# Patient Record
Sex: FEMALE | Race: WHITE | NOT HISPANIC OR LATINO | Employment: PART TIME | URBAN - METROPOLITAN AREA
[De-identification: names, ages, dates, MRNs, and addresses within clinical notes are randomized per-mention and may not be internally consistent; named-entity substitution may affect disease eponyms.]

---

## 2017-03-04 ENCOUNTER — HOSPITAL ENCOUNTER (EMERGENCY)
Facility: HOSPITAL | Age: 42
End: 2017-03-05
Attending: EMERGENCY MEDICINE | Admitting: EMERGENCY MEDICINE
Payer: COMMERCIAL

## 2017-03-04 DIAGNOSIS — R46.89 AGGRESSIVE BEHAVIOR: Primary | ICD-10-CM

## 2017-03-04 DIAGNOSIS — T07.XXXA MULTIPLE CONTUSIONS: ICD-10-CM

## 2017-03-04 DIAGNOSIS — F10.929 ALCOHOL INTOXICATION (HCC): ICD-10-CM

## 2017-03-04 DIAGNOSIS — Z72.89 SELF-MUTILATION: ICD-10-CM

## 2017-03-04 DIAGNOSIS — R45.89 SUICIDAL BEHAVIOR: ICD-10-CM

## 2017-03-04 LAB
ALBUMIN SERPL BCP-MCNC: 3.5 G/DL (ref 3.5–5)
ALP SERPL-CCNC: 64 U/L (ref 46–116)
ALT SERPL W P-5'-P-CCNC: 30 U/L (ref 12–78)
ANION GAP SERPL CALCULATED.3IONS-SCNC: 14 MMOL/L (ref 4–13)
APAP SERPL-MCNC: <2 UG/ML (ref 10–30)
AST SERPL W P-5'-P-CCNC: 32 U/L (ref 5–45)
BACTERIA UR QL AUTO: ABNORMAL /HPF
BASOPHILS # BLD AUTO: 0.02 THOUSANDS/ΜL (ref 0–0.1)
BASOPHILS NFR BLD AUTO: 0 % (ref 0–1)
BILIRUB SERPL-MCNC: 0.35 MG/DL (ref 0.2–1)
BILIRUB UR QL STRIP: NEGATIVE
BUN SERPL-MCNC: 17 MG/DL (ref 5–25)
CALCIUM SERPL-MCNC: 8.1 MG/DL (ref 8.3–10.1)
CHLORIDE SERPL-SCNC: 106 MMOL/L (ref 100–108)
CLARITY UR: CLEAR
CO2 SERPL-SCNC: 26 MMOL/L (ref 21–32)
COLOR UR: YELLOW
CREAT SERPL-MCNC: 0.85 MG/DL (ref 0.6–1.3)
EOSINOPHIL # BLD AUTO: 0.03 THOUSAND/ΜL (ref 0–0.61)
EOSINOPHIL NFR BLD AUTO: 0 % (ref 0–6)
ERYTHROCYTE [DISTWIDTH] IN BLOOD BY AUTOMATED COUNT: 15.4 % (ref 11.6–15.1)
ETHANOL SERPL-MCNC: 111 MG/DL (ref 0–3)
ETHANOL SERPL-MCNC: 66 MG/DL (ref 0–3)
GFR SERPL CREATININE-BSD FRML MDRD: >60 ML/MIN/1.73SQ M
GLUCOSE SERPL-MCNC: 67 MG/DL (ref 65–140)
GLUCOSE UR STRIP-MCNC: NEGATIVE MG/DL
HCG UR QL: NEGATIVE
HCT VFR BLD AUTO: 40.8 % (ref 34.8–46.1)
HGB BLD-MCNC: 12.9 G/DL (ref 11.5–15.4)
HGB UR QL STRIP.AUTO: ABNORMAL
KETONES UR STRIP-MCNC: ABNORMAL MG/DL
LEUKOCYTE ESTERASE UR QL STRIP: ABNORMAL
LYMPHOCYTES # BLD AUTO: 1.33 THOUSANDS/ΜL (ref 0.6–4.47)
LYMPHOCYTES NFR BLD AUTO: 17 % (ref 14–44)
MAGNESIUM SERPL-MCNC: 1.9 MG/DL (ref 1.6–2.6)
MCH RBC QN AUTO: 26.2 PG (ref 26.8–34.3)
MCHC RBC AUTO-ENTMCNC: 31.6 G/DL (ref 31.4–37.4)
MCV RBC AUTO: 83 FL (ref 82–98)
MONOCYTES # BLD AUTO: 0.42 THOUSAND/ΜL (ref 0.17–1.22)
MONOCYTES NFR BLD AUTO: 5 % (ref 4–12)
NEUTROPHILS # BLD AUTO: 6.14 THOUSANDS/ΜL (ref 1.85–7.62)
NEUTS SEG NFR BLD AUTO: 78 % (ref 43–75)
NITRITE UR QL STRIP: NEGATIVE
NON-SQ EPI CELLS URNS QL MICRO: ABNORMAL /HPF
NRBC BLD AUTO-RTO: 0 /100 WBCS
OTHER STN SPEC: ABNORMAL
PH UR STRIP.AUTO: 5 [PH] (ref 4.5–8)
PLATELET # BLD AUTO: 213 THOUSANDS/UL (ref 149–390)
PMV BLD AUTO: 10 FL (ref 8.9–12.7)
POTASSIUM SERPL-SCNC: 4.5 MMOL/L (ref 3.5–5.3)
PROT SERPL-MCNC: 7.4 G/DL (ref 6.4–8.2)
PROT UR STRIP-MCNC: NEGATIVE MG/DL
RBC # BLD AUTO: 4.92 MILLION/UL (ref 3.81–5.12)
RBC #/AREA URNS AUTO: ABNORMAL /HPF
SALICYLATES SERPL-MCNC: 4.1 MG/DL (ref 3–20)
SODIUM SERPL-SCNC: 146 MMOL/L (ref 136–145)
SP GR UR STRIP.AUTO: 1.02 (ref 1–1.03)
UROBILINOGEN UR QL STRIP.AUTO: 0.2 E.U./DL
WBC # BLD AUTO: 7.94 THOUSAND/UL (ref 4.31–10.16)
WBC #/AREA URNS AUTO: ABNORMAL /HPF

## 2017-03-04 PROCEDURE — 80329 ANALGESICS NON-OPIOID 1 OR 2: CPT | Performed by: EMERGENCY MEDICINE

## 2017-03-04 PROCEDURE — 81001 URINALYSIS AUTO W/SCOPE: CPT

## 2017-03-04 PROCEDURE — 80053 COMPREHEN METABOLIC PANEL: CPT | Performed by: EMERGENCY MEDICINE

## 2017-03-04 PROCEDURE — 36415 COLL VENOUS BLD VENIPUNCTURE: CPT | Performed by: EMERGENCY MEDICINE

## 2017-03-04 PROCEDURE — 96372 THER/PROPH/DIAG INJ SC/IM: CPT

## 2017-03-04 PROCEDURE — 80320 DRUG SCREEN QUANTALCOHOLS: CPT | Performed by: EMERGENCY MEDICINE

## 2017-03-04 PROCEDURE — 87086 URINE CULTURE/COLONY COUNT: CPT

## 2017-03-04 PROCEDURE — 81025 URINE PREGNANCY TEST: CPT | Performed by: EMERGENCY MEDICINE

## 2017-03-04 PROCEDURE — 81002 URINALYSIS NONAUTO W/O SCOPE: CPT | Performed by: EMERGENCY MEDICINE

## 2017-03-04 PROCEDURE — 83735 ASSAY OF MAGNESIUM: CPT | Performed by: EMERGENCY MEDICINE

## 2017-03-04 PROCEDURE — 85025 COMPLETE CBC W/AUTO DIFF WBC: CPT | Performed by: EMERGENCY MEDICINE

## 2017-03-04 RX ORDER — MIDAZOLAM HYDROCHLORIDE 1 MG/ML
INJECTION INTRAMUSCULAR; INTRAVENOUS
Status: COMPLETED
Start: 2017-03-04 | End: 2017-03-04

## 2017-03-04 RX ORDER — ZIPRASIDONE MESYLATE 20 MG/ML
10 INJECTION, POWDER, LYOPHILIZED, FOR SOLUTION INTRAMUSCULAR ONCE
Status: COMPLETED | OUTPATIENT
Start: 2017-03-04 | End: 2017-03-04

## 2017-03-04 RX ORDER — LORAZEPAM 2 MG/ML
2 INJECTION INTRAMUSCULAR ONCE
Status: COMPLETED | OUTPATIENT
Start: 2017-03-04 | End: 2017-03-04

## 2017-03-04 RX ORDER — LORAZEPAM 2 MG/ML
1 INJECTION INTRAMUSCULAR ONCE
Status: COMPLETED | OUTPATIENT
Start: 2017-03-04 | End: 2017-03-04

## 2017-03-04 RX ADMIN — LORAZEPAM 2 MG: 2 INJECTION INTRAMUSCULAR; INTRAVENOUS at 13:40

## 2017-03-04 RX ADMIN — ZIPRASIDONE MESYLATE 10 MG: 20 INJECTION, POWDER, LYOPHILIZED, FOR SOLUTION INTRAMUSCULAR at 13:40

## 2017-03-04 RX ADMIN — LORAZEPAM 1 MG: 2 INJECTION, SOLUTION INTRAMUSCULAR; INTRAVENOUS at 14:00

## 2017-03-04 RX ADMIN — ZIPRASIDONE MESYLATE 10 MG: 20 INJECTION, POWDER, LYOPHILIZED, FOR SOLUTION INTRAMUSCULAR at 14:00

## 2017-03-05 VITALS
HEART RATE: 73 BPM | SYSTOLIC BLOOD PRESSURE: 142 MMHG | RESPIRATION RATE: 18 BRPM | DIASTOLIC BLOOD PRESSURE: 81 MMHG | TEMPERATURE: 96.5 F | OXYGEN SATURATION: 93 %

## 2017-03-05 PROCEDURE — 99285 EMERGENCY DEPT VISIT HI MDM: CPT

## 2017-03-07 LAB — BACTERIA UR CULT: NORMAL

## 2017-07-22 ENCOUNTER — HOSPITAL ENCOUNTER (EMERGENCY)
Facility: HOSPITAL | Age: 42
Discharge: LEFT BEFORE DISCHARGE | End: 2017-07-22
Attending: EMERGENCY MEDICINE
Payer: COMMERCIAL

## 2017-07-22 DIAGNOSIS — N89.8 VAGINAL DISCHARGE: Primary | ICD-10-CM

## 2017-07-22 LAB
AMPHETAMINES SERPL QL SCN: NEGATIVE
BACTERIA UR QL AUTO: ABNORMAL /HPF
BARBITURATES UR QL: NEGATIVE
BENZODIAZ UR QL: NEGATIVE
BILIRUB UR QL STRIP: ABNORMAL
CLARITY UR: ABNORMAL
CLARITY, POC: NORMAL
COCAINE UR QL: POSITIVE
COLOR UR: YELLOW
COLOR, POC: YELLOW
GLUCOSE UR STRIP-MCNC: NEGATIVE MG/DL
HCG UR QL: NEGATIVE
HGB UR QL STRIP.AUTO: ABNORMAL
KETONES UR STRIP-MCNC: NEGATIVE MG/DL
LEUKOCYTE ESTERASE UR QL STRIP: NEGATIVE
METHADONE UR QL: NEGATIVE
NITRITE UR QL STRIP: NEGATIVE
NON-SQ EPI CELLS URNS QL MICRO: ABNORMAL /HPF
OPIATES UR QL SCN: NEGATIVE
PCP UR QL: NEGATIVE
PH UR STRIP.AUTO: 5 [PH] (ref 4.5–8)
PROT UR STRIP-MCNC: NEGATIVE MG/DL
RBC #/AREA URNS AUTO: ABNORMAL /HPF
SP GR UR STRIP.AUTO: >=1.03 (ref 1–1.03)
THC UR QL: NEGATIVE
UROBILINOGEN UR QL STRIP.AUTO: 0.2 E.U./DL
WBC #/AREA URNS AUTO: ABNORMAL /HPF

## 2017-07-22 PROCEDURE — 80307 DRUG TEST PRSMV CHEM ANLYZR: CPT | Performed by: NURSE PRACTITIONER

## 2017-07-22 PROCEDURE — 81002 URINALYSIS NONAUTO W/O SCOPE: CPT | Performed by: NURSE PRACTITIONER

## 2017-07-22 PROCEDURE — 81001 URINALYSIS AUTO W/SCOPE: CPT

## 2017-07-22 PROCEDURE — 87591 N.GONORRHOEAE DNA AMP PROB: CPT | Performed by: NURSE PRACTITIONER

## 2017-07-22 PROCEDURE — 81025 URINE PREGNANCY TEST: CPT | Performed by: NURSE PRACTITIONER

## 2017-07-22 PROCEDURE — 99284 EMERGENCY DEPT VISIT MOD MDM: CPT

## 2017-07-22 PROCEDURE — 87491 CHLMYD TRACH DNA AMP PROBE: CPT | Performed by: NURSE PRACTITIONER

## 2017-07-24 LAB
CHLAMYDIA DNA CVX QL NAA+PROBE: NORMAL
N GONORRHOEA DNA GENITAL QL NAA+PROBE: NORMAL

## 2018-01-17 NOTE — MISCELLANEOUS
Provider Comments  Provider Comments:   Patient did not come to her scheduled appointment today 02/25/16 so she was called but phone # listed was not in working order and there is no other # to contact her        Signatures   Electronically signed by : ISAIAS Zhang ; Feb 25 2016  6:31PM EST                       (Author)

## 2018-04-30 ENCOUNTER — TELEPHONE (OUTPATIENT)
Dept: BARIATRICS | Facility: CLINIC | Age: 43
End: 2018-04-30

## 2018-05-30 RX ORDER — ESCITALOPRAM OXALATE 10 MG/1
10 TABLET ORAL
COMMUNITY
End: 2021-06-02 | Stop reason: ALTCHOICE

## 2018-05-30 RX ORDER — LANOLIN ALCOHOL/MO/W.PET/CERES
5 CREAM (GRAM) TOPICAL
COMMUNITY

## 2018-06-06 ENCOUNTER — OFFICE VISIT (OUTPATIENT)
Dept: BARIATRICS | Facility: CLINIC | Age: 43
End: 2018-06-06

## 2018-06-06 DIAGNOSIS — E66.01 OBESITY, CLASS III, BMI 40-49.9 (MORBID OBESITY) (HCC): Primary | ICD-10-CM

## 2018-06-06 PROCEDURE — RECHECK

## 2018-06-06 NOTE — PROGRESS NOTES
Met with patient and explained she is not a candidate for bariatric surgery due to mental health diagnosis of schizophrenia

## 2020-01-21 ENCOUNTER — TELEPHONE (OUTPATIENT)
Dept: INTERNAL MEDICINE CLINIC | Facility: CLINIC | Age: 45
End: 2020-01-21

## 2020-01-21 NOTE — TELEPHONE ENCOUNTER
Unable to [facility] [place] phone says "the person you are trying to reach in unavailable, try your call later"      Will try later

## 2020-03-02 ENCOUNTER — OFFICE VISIT (OUTPATIENT)
Dept: URGENT CARE | Age: 45
End: 2020-03-02
Payer: COMMERCIAL

## 2020-03-02 VITALS
HEIGHT: 64 IN | WEIGHT: 263.2 LBS | HEART RATE: 77 BPM | OXYGEN SATURATION: 98 % | RESPIRATION RATE: 20 BRPM | BODY MASS INDEX: 44.94 KG/M2 | TEMPERATURE: 98 F

## 2020-03-02 DIAGNOSIS — N61.1 ABSCESS OF LEFT BREAST: Primary | ICD-10-CM

## 2020-03-02 PROCEDURE — G0382 LEV 3 HOSP TYPE B ED VISIT: HCPCS | Performed by: PHYSICIAN ASSISTANT

## 2020-03-02 PROCEDURE — 99203 OFFICE O/P NEW LOW 30 MIN: CPT | Performed by: PHYSICIAN ASSISTANT

## 2020-03-02 PROCEDURE — 99283 EMERGENCY DEPT VISIT LOW MDM: CPT | Performed by: PHYSICIAN ASSISTANT

## 2020-03-02 PROCEDURE — 87205 SMEAR GRAM STAIN: CPT | Performed by: PHYSICIAN ASSISTANT

## 2020-03-02 PROCEDURE — 87070 CULTURE OTHR SPECIMN AEROBIC: CPT | Performed by: PHYSICIAN ASSISTANT

## 2020-03-02 RX ORDER — ATOMOXETINE 25 MG/1
CAPSULE ORAL
COMMUNITY
Start: 2020-02-10 | End: 2021-06-02 | Stop reason: ALTCHOICE

## 2020-03-02 RX ORDER — CEPHALEXIN 500 MG/1
500 CAPSULE ORAL EVERY 8 HOURS SCHEDULED
Qty: 21 CAPSULE | Refills: 0 | Status: SHIPPED | OUTPATIENT
Start: 2020-03-02 | End: 2020-03-09

## 2020-03-02 RX ORDER — GABAPENTIN 300 MG/1
CAPSULE ORAL
COMMUNITY
End: 2021-06-02 | Stop reason: ALTCHOICE

## 2020-03-02 RX ORDER — BUSPIRONE HYDROCHLORIDE 5 MG/1
TABLET ORAL
COMMUNITY
Start: 2020-02-05 | End: 2021-06-02 | Stop reason: ALTCHOICE

## 2020-03-02 RX ORDER — SULFAMETHOXAZOLE AND TRIMETHOPRIM 800; 160 MG/1; MG/1
1 TABLET ORAL DAILY
Qty: 7 TABLET | Refills: 0 | Status: SHIPPED | OUTPATIENT
Start: 2020-03-02 | End: 2020-03-09

## 2020-03-02 RX ORDER — HALOPERIDOL 2 MG/1
TABLET ORAL
COMMUNITY
Start: 2020-02-19 | End: 2021-06-02 | Stop reason: ALTCHOICE

## 2020-03-02 NOTE — PATIENT INSTRUCTIONS
Continue to monitor symptoms  If new or worsening symptoms develop, go immediately to Er  Drink plenty of fluids  Follow up with Family Doctor this week  Abscess   WHAT YOU NEED TO KNOW:   A warm compress may help your abscess drain  Your healthcare provider may make a cut in the abscess so it can drain  You may need surgery to remove an abscess that is on your hands or buttocks  DISCHARGE INSTRUCTIONS:   Return to the emergency department if:   · The area around your abscess becomes very painful, warm, or has red streaks  · You have a fever and chills  · Your heart is beating faster than usual      · You feel faint or confused  Contact your healthcare provider if:   · Your abscess gets bigger or does not get better  · Your abscess returns  · You have questions or concerns about your condition or care  Medicines: You may  need any of the following:  · Antibiotics  help treat a bacterial infection  · Acetaminophen  decreases pain and fever  It is available without a doctor's order  Ask how much to take and how often to take it  Follow directions  Acetaminophen can cause liver damage if not taken correctly  · NSAIDs , such as ibuprofen, help decrease swelling, pain, and fever  This medicine is available with or without a doctor's order  NSAIDs can cause stomach bleeding or kidney problems in certain people  If you take blood thinner medicine, always ask your healthcare provider if NSAIDs are safe for you  Always read the medicine label and follow directions  · Take your medicine as directed  Contact your healthcare provider if you think your medicine is not helping or if you have side effects  Tell him or her if you are allergic to any medicine  Keep a list of the medicines, vitamins, and herbs you take  Include the amounts, and when and why you take them  Bring the list or the pill bottles to follow-up visits  Carry your medicine list with you in case of an emergency    Self-care: · Apply a warm compress to your abscess  This will help it open and drain  Wet a washcloth in warm, but not hot, water  Apply the compress for 10 minutes  Repeat this 4 times each day  Do not  press on an abscess or try to open it with a needle  You may push the bacteria deeper or into your blood  · Do not share your clothes, towels, or sheets with anyone  This can spread the infection to others  · Wash your hands often  This can help prevent the spread of germs  Use soap and water or an alcohol-based hand rub  Care for your wound after it is drained:   · Care for your wound as directed  If your healthcare provider says it is okay, carefully remove the bandage and gauze packing  You may need to soak the gauze to get it out of your wound  Clean your wound and the area around it as directed  Dry the area and put on new, clean bandages  Change your bandages when they get wet or dirty  · Ask your healthcare provider how to change the gauze in your wound  Keep track of how many pieces of gauze are placed inside the wound  Do not put too much packing in the wound  Do not pack the gauze too tightly in your wound  Follow up with your healthcare provider in 1 to 3 days: You may need to have your packing removed or your bandage changed  Write down your questions so you remember to ask them during your visits  © 2017 [employer] [employer] Information is for End User's use only and may not be sold, redistributed or otherwise used for commercial purposes  All illustrations and images included in CareNotes® are the copyrighted property of A D A M [employer]  or [employer]  The above information is an  only  It is not intended as medical advice for individual conditions or treatments  Talk to your doctor, nurse or pharmacist before following any medical regimen to see if it is safe and effective for you

## 2020-03-02 NOTE — PROGRESS NOTES
[facility]        NAME: Arnold Shaw is a 53 y o  female  : 1975    MRN: [id number]  DATE: 2020  TIME: 2:12 PM    Assessment and Plan   Abscess of left breast [N61 1]  1  Abscess of left breast  cephalexin (KEFLEX) 500 mg capsule    sulfamethoxazole-trimethoprim (BACTRIM DS) 800-160 mg per tablet    Ambulatory referral to Dermatology    Wound culture and Gram stain         Patient Instructions       Take medications as directed  Drink plenty of fluids  Follow up with family doctor this week  Go to ER immediately if new or worsening symptoms occur  Chief Complaint     Chief Complaint   Patient presents with    Recurrent Skin Infections     Pt started Thursday with a boil on her left breast   Pt states she has been applying Neosporin and A & D ointment, boil popped last roshni  C/O burning and pain, has some drainage  Had boil in same area approx 1-2 years ago  History of Present Illness       Rash   Episode onset: 4 days ago pt developed abscess to L breast   Slowly growing  This morning it came to a head and she squeezed it  She describes copius green drainage  Still mild ttp  She has had one in this area about 1 year ago  The problem has been gradually improving since onset  Associated with: No new exposures  Patient has history of recurrent abscesses in groin, axilla, and once before in the breast  Pertinent negatives include no diarrhea, fatigue, fever, shortness of breath or vomiting  (No fevers, chills, nausea, vomiting  No systemic signs of infection) Treatments tried: Warm compresses, A&D ointment, Neosporin  The treatment provided moderate relief  Review of Systems   Review of Systems   Constitutional: Negative  Negative for chills, fatigue and fever  HENT: Negative  Eyes: Negative  Respiratory: Negative  Negative for chest tightness, shortness of breath and wheezing  Cardiovascular: Negative  Negative for chest pain and palpitations  Gastrointestinal: Negative for abdominal pain, constipation, diarrhea, nausea and vomiting  Endocrine: Negative  Genitourinary: Negative for dysuria, flank pain, frequency, pelvic pain, vaginal discharge and vaginal pain  Musculoskeletal: Negative for back pain, gait problem, neck pain and neck stiffness  Skin: Positive for rash  Negative for pallor  Allergic/Immunologic: Negative  Neurological: Negative  Negative for weakness and numbness  Hematological: Negative  Psychiatric/Behavioral: Negative            Current Medications       Current Outpatient Medications:     ALBUTEROL SULFATE IN, Inhale, Disp: , Rfl:     atoMOXetine (STRATTERA) 25 mg capsule, TK ONE C PO ONCE D, Disp: , Rfl:     busPIRone (BUSPAR) 5 mg tablet, TK 1 T PO TID, Disp: , Rfl:     cephalexin (KEFLEX) 500 mg capsule, Take 1 capsule (500 mg total) by mouth every 8 (eight) hours for 7 days, Disp: 21 capsule, Rfl: 0    DOXEPIN HCL PO, Take by mouth, Disp: , Rfl:     escitalopram (LEXAPRO) 10 mg tablet, Take 10 mg by mouth, Disp: , Rfl:     gabapentin (NEURONTIN) 300 mg capsule, gabapentin 600 mg tablet, Disp: , Rfl:     haloperidol (HALDOL) 2 mg tablet, TK 1 T PO  BID, Disp: , Rfl:     melatonin 3 mg, Take 5 mg by mouth, Disp: , Rfl:     QUEtiapine Fumarate (SEROQUEL PO), Take by mouth, Disp: , Rfl:     sulfamethoxazole-trimethoprim (BACTRIM DS) 800-160 mg per tablet, Take 1 tablet by mouth daily for 7 days, Disp: 7 tablet, Rfl: 0    Current Allergies     Allergies as of 03/02/2020    (No Known Allergies)            The following portions of the patient's history were reviewed and updated as appropriate: allergies, current medications, past family history, past medical history, past social history, past surgical history and problem list      Past Medical History:   Diagnosis Date    Asthma     Bipolar disorder ([facility] [address] )     Hepatitis C     Psychiatric disorder        Past Surgical History:   Procedure Laterality Date    HAND SURGERY      TUBAL LIGATION         Family History   Problem Relation Age of Onset    Coronary artery disease Mother     Diabetes Mother          Medications have been verified  Objective   Pulse 77   Temp 98 °F (36 7 °C)   Resp 20   Ht 5' 4" (1 626 m)   Wt 119 kg (263 lb 3 2 oz)   SpO2 98%   BMI 45 18 kg/m²        Physical Exam     Physical Exam   Constitutional: She appears well-developed and well-nourished  No distress  HENT:   Head: Normocephalic and atraumatic  Cardiovascular: Normal rate, regular rhythm, normal heart sounds and intact distal pulses  Pulmonary/Chest: Effort normal and breath sounds normal  No respiratory distress  She has no wheezes  She has no rales  Skin: Skin is warm  Capillary refill takes less than 2 seconds  She is not diaphoretic  No pallor  Quarter-sized area of erythematous tender indurated skin to left breast   Small central opening with small amount of green discharge visualized  Unable to express more discharge with palpation  No venous streaking  No palpable fluctuance noted   Nursing note and vitals reviewed

## 2020-03-04 LAB
BACTERIA WND AEROBE CULT: NORMAL
GRAM STN SPEC: NORMAL
GRAM STN SPEC: NORMAL

## 2020-07-01 ENCOUNTER — OFFICE VISIT (OUTPATIENT)
Dept: URGENT CARE | Age: 45
End: 2020-07-01
Payer: COMMERCIAL

## 2020-07-01 ENCOUNTER — APPOINTMENT (OUTPATIENT)
Dept: RADIOLOGY | Age: 45
End: 2020-07-01
Payer: COMMERCIAL

## 2020-07-01 VITALS
WEIGHT: 263 LBS | OXYGEN SATURATION: 100 % | RESPIRATION RATE: 22 BRPM | HEIGHT: 64 IN | BODY MASS INDEX: 44.9 KG/M2 | HEART RATE: 80 BPM | TEMPERATURE: 98 F | DIASTOLIC BLOOD PRESSURE: 68 MMHG | SYSTOLIC BLOOD PRESSURE: 142 MMHG

## 2020-07-01 DIAGNOSIS — M79.662 PAIN IN LEFT LOWER LEG: ICD-10-CM
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
## 2020-07-01 PROCEDURE — 99283 EMERGENCY DEPT VISIT LOW MDM: CPT | Performed by: NURSE PRACTITIONER

## 2020-07-01 PROCEDURE — 99213 OFFICE O/P EST LOW 20 MIN: CPT | Performed by: NURSE PRACTITIONER

## 2020-07-01 PROCEDURE — G0382 LEV 3 HOSP TYPE B ED VISIT: HCPCS | Performed by: NURSE PRACTITIONER

## 2020-07-01 PROCEDURE — 73590 X-RAY EXAM OF LOWER LEG: CPT

## 2020-07-02 NOTE — PROGRESS NOTES
NAME: Henry Alexandre is a 50 y o  female  : 1975    MRN: [id number]    /68   Pulse 80   Temp 98 °F (36 7 °C)   Resp 22   Ht 5' 4" (1 626 m)   Wt 119 kg (263 lb)   SpO2 100%   BMI 45 14 kg/m²     Assessment and Plan   Fall, initial encounter [W19  XXXA]  1  Fall, initial encounter  XR tibia fibula 2 vw left    Transfer to other facility   2  Pain in left lower leg  Transfer to other facility       Dyan was seen today for leg swelling  Diagnoses and all orders for this visit:    Fall, initial encounter  -     XR tibia fibula 2 vw left; Future  -     Transfer to other facility    Pain in left lower leg  -     Transfer to other facility        Patient Instructions   Patient Instructions   Follow up with pcp  Take meds as directed  Tylenol and motrin for pain   If worse go to the ER, discussed going to night and states she will go tomorrow,      Leg Pain   WHAT YOU NEED TO KNOW:   Leg pain may be caused by a variety of health conditions  Your tests did not show any broken bones or blood clots  DISCHARGE INSTRUCTIONS:   Return to the emergency department if:   · You have a fever  · Your leg starts to swell  · Your leg pain gets worse  · You have numbness or tingling in your leg or toes  · You cannot put any weight on or move your leg  Contact your healthcare provider if:   · Your pain does not decrease, even after treatment  · You have questions or concerns about your condition or care  Medicines:   · NSAIDs , such as ibuprofen, help decrease swelling, pain, and fever  This medicine is available with or without a doctor's order  NSAIDs can cause stomach bleeding or kidney problems in certain people  If you take blood thinner medicine, always ask your healthcare provider if NSAIDs are safe for you  Always read the medicine label and follow directions  · Take your medicine as directed    Contact your healthcare provider if you think your medicine is not helping or if you have side effects  Tell him of her if you are allergic to any medicine  Keep a list of the medicines, vitamins, and herbs you take  Include the amounts, and when and why you take them  Bring the list or the pill bottles to follow-up visits  Carry your medicine list with you in case of an emergency  Follow up with your healthcare provider as directed: You may need more tests to find the cause of your leg pain  You may need to see an orthopedic specialist or a physical therapist  Write down your questions so you remember to ask them during your visits  Manage your leg pain:   · Rest  your injured leg so that it can heal  You may need an immobilizer, brace, or splint to limit the movement of your leg  You may need to avoid putting any weight on your leg for at least 48 hours  Return to normal activities as directed  · Ice  the injury for 20 minutes every 4 hours for up to 24 hours, or as directed  Use an ice pack, or put crushed ice in a plastic bag  Cover it with a towel to protect your skin  Ice helps prevent tissue damage and decreases swelling and pain  · Elevate  your injured leg above the level of your heart as often as you can  This will help decrease swelling and pain  If possible, prop your leg on pillows or blankets to keep the area elevated comfortably  · Use assistive devices as directed  You may need to use a cane or crutches  Assistive devices help decrease pain and pressure on your leg when you walk  Ask your healthcare provider for more information about assistive devices and how to use them correctly  · Maintain a healthy weight  Extra body weight can cause pressure and pain in your hip, knee, and ankle joints  Ask your healthcare provider how much you should weigh  Ask him to help you create a weight loss plan if you are overweight  © 2017 [employer] Information is for End User's use only and may not be sold, redistributed or otherwise used for commercial purposes  All illustrations and images included in CareNotes® are the copyrighted property of [employer]  or [employer]obar  The above information is an  only  It is not intended as medical advice for individual conditions or treatments  Talk to your doctor, nurse or pharmacist before following any medical regimen to see if it is safe and effective for you  Proceed to ER if symptoms worsen  Chief Complaint     Chief Complaint   Patient presents with    Leg Swelling     pt is c/o pain to her left foot and calf area  History of Present Illness     49 yo obese female here today with lower left leg pain that has been going on for the past 3 days  Patient states that she fell and felt a pop to the back of her lower left leg  She has a small bruise present and is adamant that she has stayed in bed for 3 days with the help of her friend assisting her to use the restroom  Patient verbalizes "I literally have stating my bed for 3 days straight"  Patient has used over-the-counter meds and ice with no relief  Patient denies the ability to stand up on her left leg and bear weight and not attempting at time of visit  She is sitting and hospital wheelchair at this time and discussed going to the ER for further evaluation  Patient states she perhaps will go tomorrow  She has pain to the calf area of the left leg and discussed possibilities of blood clots and refused to go at this time but may be later or tomorrow she will go  She states it all depends on her friend schedule a when she can go  I discussed in detail with patient the need for further evaluation  Review of Systems   Review of Systems   Musculoskeletal: Positive for gait problem          Lower left leg   Skin:        Bruise to the left leg         Current Medications       Current Outpatient Medications:     ALBUTEROL SULFATE IN, Inhale, Disp: , Rfl:     atoMOXetine (STRATTERA) 25 mg capsule, TK ONE C PO ONCE D, Disp: , Rfl:     busPIRone (BUSPAR) 5 mg tablet, TK 1 T PO TID, Disp: , Rfl:     DOXEPIN HCL PO, Take by mouth, Disp: , Rfl:     escitalopram (LEXAPRO) 10 mg tablet, Take 10 mg by mouth, Disp: , Rfl:     gabapentin (NEURONTIN) 300 mg capsule, gabapentin 600 mg tablet, Disp: , Rfl:     haloperidol (HALDOL) 2 mg tablet, TK 1 T PO  BID, Disp: , Rfl:     melatonin 3 mg, Take 5 mg by mouth, Disp: , Rfl:     QUEtiapine Fumarate (SEROQUEL PO), Take by mouth, Disp: , Rfl:     Current Allergies     Allergies as of 07/01/2020    (No Known Allergies)              Past Medical History:   Diagnosis Date    Asthma     [facility] disorder ([facility] [address] )     Hepatitis C     Psychiatric disorder        Past Surgical History:   Procedure Laterality Date    HAND SURGERY      TUBAL LIGATION         Family History   Problem Relation Age of Onset    Coronary artery disease Mother     Diabetes Mother          Medications have been verified  The following portions of the patient's history were reviewed and updated as appropriate: allergies, current medications, past family history, past medical history, past social history, past surgical history and problem list     Objective   /68   Pulse 80   Temp 98 °F (36 7 °C)   Resp 22   Ht 5' 4" (1 626 m)   Wt 119 kg (263 lb)   SpO2 100%   BMI 45 14 kg/m²      Physical Exam     Physical Exam   Constitutional: She is oriented to person, place, and time  Musculoskeletal:        Left lower leg: She exhibits tenderness, swelling and edema  She exhibits no bony tenderness, no deformity and no laceration  Legs:  Neurological: She is alert and oriented to person, place, and time  Skin: Capillary refill takes less than 2 seconds  Ecchymosis (Lower left leg) noted  No rash noted  Psychiatric: Her mood appears anxious         FIDE Antonio

## 2020-07-02 NOTE — PATIENT INSTRUCTIONS
Follow up with pcp  Take meds as directed  Tylenol and motrin for pain   If worse go to the ER, discussed going to night and states she will go tomorrow,      Leg Pain   WHAT YOU NEED TO KNOW:   Leg pain may be caused by a variety of health conditions  Your tests did not show any broken bones or blood clots  DISCHARGE INSTRUCTIONS:   Return to the emergency department if:   · You have a fever  · Your leg starts to swell  · Your leg pain gets worse  · You have numbness or tingling in your leg or toes  · You cannot put any weight on or move your leg  Contact your healthcare provider if:   · Your pain does not decrease, even after treatment  · You have questions or concerns about your condition or care  Medicines:   · NSAIDs , such as ibuprofen, help decrease swelling, pain, and fever  This medicine is available with or without a doctor's order  NSAIDs can cause stomach bleeding or kidney problems in certain people  If you take blood thinner medicine, always ask your healthcare provider if NSAIDs are safe for you  Always read the medicine label and follow directions  · Take your medicine as directed  Contact your healthcare provider if you think your medicine is not helping or if you have side effects  Tell him of her if you are allergic to any medicine  Keep a list of the medicines, vitamins, and herbs you take  Include the amounts, and when and why you take them  Bring the list or the pill bottles to follow-up visits  Carry your medicine list with you in case of an emergency  Follow up with your healthcare provider as directed: You may need more tests to find the cause of your leg pain  You may need to see an orthopedic specialist or a physical therapist  Write down your questions so you remember to ask them during your visits  Manage your leg pain:   · Rest  your injured leg so that it can heal  You may need an immobilizer, brace, or splint to limit the movement of your leg   You may need to avoid putting any weight on your leg for at least 48 hours  Return to normal activities as directed  · Ice  the injury for 20 minutes every 4 hours for up to 24 hours, or as directed  Use an ice pack, or put crushed ice in a plastic bag  Cover it with a towel to protect your skin  Ice helps prevent tissue damage and decreases swelling and pain  · Elevate  your injured leg above the level of your heart as often as you can  This will help decrease swelling and pain  If possible, prop your leg on pillows or blankets to keep the area elevated comfortably  · Use assistive devices as directed  You may need to use a cane or crutches  Assistive devices help decrease pain and pressure on your leg when you walk  Ask your healthcare provider for more information about assistive devices and how to use them correctly  · Maintain a healthy weight  Extra body weight can cause pressure and pain in your hip, knee, and ankle joints  Ask your healthcare provider how much you should weigh  Ask him to help you create a weight loss plan if you are overweight  © 2017 [address] [facility] [place] Information is for End User's use only and may not be sold, redistributed or otherwise used for commercial purposes  All illustrations and images included in CareNotes® are the copyrighted property of [employer] M [employer]  or [employer]  The above information is an  only  It is not intended as medical advice for individual conditions or treatments  Talk to your doctor, nurse or pharmacist before following any medical regimen to see if it is safe and effective for you

## 2020-07-03 ENCOUNTER — HOSPITAL ENCOUNTER (EMERGENCY)
Facility: HOSPITAL | Age: 45
Discharge: HOME | End: 2020-07-03
Attending: EMERGENCY MEDICINE
Payer: COMMERCIAL

## 2020-07-03 VITALS
WEIGHT: 258.38 LBS | BODY MASS INDEX: 44.35 KG/M2 | RESPIRATION RATE: 20 BRPM | SYSTOLIC BLOOD PRESSURE: 115 MMHG | OXYGEN SATURATION: 98 % | TEMPERATURE: 97.7 F | HEART RATE: 93 BPM | DIASTOLIC BLOOD PRESSURE: 86 MMHG

## 2020-07-03 DIAGNOSIS — S80.12XA TRAUMATIC ECCHYMOSIS OF LEFT LOWER LEG, INITIAL ENCOUNTER: ICD-10-CM

## 2020-07-03 DIAGNOSIS — M79.662 PAIN OF LEFT CALF: Primary | ICD-10-CM

## 2020-07-03 PROCEDURE — 99283 EMERGENCY DEPT VISIT LOW MDM: CPT

## 2020-07-03 PROCEDURE — 99282 EMERGENCY DEPT VISIT SF MDM: CPT | Performed by: EMERGENCY MEDICINE

## 2020-07-03 NOTE — DISCHARGE INSTRUCTIONS
Tendon Rupture   WHAT YOU NEED TO KNOW:   A tendon rupture is a partial or complete tear of your tendon  Tendons are tough bands of tissue that attach your muscles to your bones  A tear may be caused by an injury or increased pressure on the tendon that occurs during sports or a fall  Your risk may be higher if you have a weak tendon  Weak tendons may be caused by tendonitis, use of steroids, older age, and chronic conditions such as arthritis  DISCHARGE INSTRUCTIONS:   Seek care immediately if:   · You have severe pain in the injured area, even after you take medicine  · Your arm or leg feels warm, tender, and painful  It may look swollen and red  · You feel lightheaded, short of breath, and have chest pain  · You cough up blood  Contact your healthcare provider if:   · Your symptoms do not get better with treatment  · You feel another pop, snap, or crack in your tendon  · You have questions or concerns about your condition or care  Medicines:   · NSAIDs , such as ibuprofen, help decrease swelling, pain, and fever  This medicine is available with or without a doctor's order  NSAIDs can cause stomach bleeding or kidney problems in certain people  If you take blood thinner medicine, always ask your healthcare provider if NSAIDs are safe for you  Always read the medicine label and follow directions  · Acetaminophen  decreases pain  It is available without a doctor's order  Ask how much to take and how often to take it  Follow directions  Acetaminophen can cause liver damage if not taken correctly  · Take your medicine as directed  Contact your healthcare provider if you think your medicine is not helping or if you have side effects  Tell him or her if you are allergic to any medicine  Keep a list of the medicines, vitamins, and herbs you take  Include the amounts, and when and why you take them  Bring the list or the pill bottles to follow-up visits   Carry your medicine list with you in case of an emergency  Follow up with your healthcare provider as directed:  Write down your questions so you remember to ask them during your visits  Self-care:   · Rest  the injured tendon until pain and swelling have decreased  Ask your healthcare provider what activities you can do while your tendon heals  · Apply ice  on your tendon for 15 to 20 minutes every hour for 48 hours or as directed  Use an ice pack, or put crushed ice in a plastic bag  Cover it with a towel  Ice helps prevent tissue damage and decreases swelling and pain  · Compress  the injury with an elastic bandage, air cast, medical boot, or splint to reduce swelling  Ask your healthcare provider which compression device to use, and how tight it should be  · Elevate  the injured area above the level of your heart as often as you can  This will help decrease swelling and pain  If possible, prop the injured area on pillows or blankets to keep it elevated comfortably  Wear support devices as directed  You may need a brace, cast, or splint to limit movement and protect your tendon  If the tendon rupture is in your leg, you may need to use crutches  This will decrease pain as you move around  Go to physical therapy as directed:  A physical therapist teaches you exercises to help improve movement and strength  © 2017 [facility] Information is for End User's use only and may not be sold, redistributed or otherwise used for commercial purposes  All illustrations and images included in CareNotes® are the copyrighted property of [employer] , [employer]  or [employer]  The above information is an  only  It is not intended as medical advice for individual conditions or treatments  Talk to your doctor, nurse or pharmacist before following any medical regimen to see if it is safe and effective for you

## 2020-07-03 NOTE — ED PROVIDER NOTES
History  Chief Complaint   Patient presents with    Leg Pain     pt states running after cat 4 days ago, heard a pop in lower posterior leg, was seen at urgent care told to be seen at ER for further testing  Pt is a 50year old female presenting with left calf pain x 4 days  Pt states she was running after a cat when she felt a sudden pain and popping sensation in her left calf  She was able to ambulate after but with pain  She notes bruising and swelling at the site of the injury  Denies fevers, erythema, warmth, n/v/d  States the pain has been improving since the injury  Seen at urgent care and told to come to ED for DVT evaluation  Denies history of DVT, recent surgery or travel  Prior to Admission Medications   Prescriptions Last Dose Informant Patient Reported? Taking? ALBUTEROL SULFATE IN   Yes No   Sig: Inhale   DOXEPIN HCL PO   Yes No   Sig: Take by mouth   QUEtiapine Fumarate (SEROQUEL PO)   Yes No   Sig: Take by mouth   atoMOXetine (STRATTERA) 25 mg capsule   Yes No   Sig: TK ONE C PO ONCE D   busPIRone (BUSPAR) 5 mg tablet   Yes No   Sig: TK 1 T PO TID   escitalopram (LEXAPRO) 10 mg tablet   Yes No   Sig: Take 10 mg by mouth   gabapentin (NEURONTIN) 300 mg capsule   Yes No   Sig: gabapentin 600 mg tablet   haloperidol (HALDOL) 2 mg tablet   Yes No   Sig: TK 1 T PO  BID   melatonin 3 mg   Yes No   Sig: Take 5 mg by mouth      Facility-Administered Medications: None       Past Medical History:   Diagnosis Date    Asthma     Bipolar disorder (HCC)     Hepatitis C     Psychiatric disorder        Past Surgical History:   Procedure Laterality Date    HAND SURGERY      TUBAL LIGATION         Family History   Problem Relation Age of Onset    Coronary artery disease Mother     Diabetes Mother      I have reviewed and agree with the history as documented      E-Cigarette/Vaping     E-Cigarette/Vaping Substances     Social History     Tobacco Use    Smoking status: Current Every Day Smoker Packs/day: 0 50     Types: Cigarettes    Smokeless tobacco: Never Used   Substance Use Topics    Alcohol use: Not Currently    Drug use: Not Currently     Types: Marijuana     Comment: crack        Review of Systems   Constitutional: Negative  HENT: Negative  Respiratory: Negative  Cardiovascular: Positive for leg swelling  Negative for chest pain and palpitations  Gastrointestinal: Negative  Genitourinary: Negative  Musculoskeletal: Positive for myalgias  Negative for arthralgias and joint swelling  Skin: Positive for color change  Neurological: Negative  All other systems reviewed and are negative  Physical Exam  Physical Exam   Constitutional: She is oriented to person, place, and time  She appears well-developed and well-nourished  No distress  HENT:   Head: Normocephalic and atraumatic  Right Ear: External ear normal    Left Ear: External ear normal    Nose: Nose normal    Eyes: Conjunctivae and EOM are normal  Right eye exhibits no discharge  Left eye exhibits no discharge  No scleral icterus  Neck: Normal range of motion  Neck supple  Cardiovascular: Normal rate, regular rhythm, normal heart sounds and intact distal pulses  Pulses:       Dorsalis pedis pulses are 2+ on the right side, and 2+ on the left side  Pulmonary/Chest: Effort normal and breath sounds normal    Musculoskeletal: Normal range of motion  Left knee: Normal         Left ankle: Achilles tendon exhibits pain  Achilles tendon exhibits no defect and normal Raman's test results  Left lower leg: She exhibits tenderness and swelling  She exhibits no bony tenderness, no edema, no deformity and no laceration  TTP of the posterior left calf with swelling and ecchymosis to area  Neurovascularly in tact distally  Neurological: She is alert and oriented to person, place, and time  Skin: Skin is warm and dry  She is not diaphoretic         Vital Signs  ED Triage Vitals [07/03/20 0043] Temperature Pulse Respirations Blood Pressure SpO2   97 7 °F (36 5 °C) 93 20 115/86 98 %      Temp Source Heart Rate Source Patient Position - Orthostatic VS BP Location FiO2 (%)   Temporal Monitor Sitting Right arm --      Pain Score       --           Vitals:    07/03/20 0043   BP: 115/86   Pulse: 93   Patient Position - Orthostatic VS: Sitting         Visual Acuity      ED Medications  Medications - No data to display    Diagnostic Studies  Results Reviewed     None                 No orders to display              Procedures  Procedures         ED Course       US AUDIT      Most Recent Value   Initial Alcohol Screen: US AUDIT-C    1  How often do you have a drink containing alcohol?  0 Filed at: 07/03/2020 0054   2  How many drinks containing alcohol do you have on a typical day you are drinking? 0 Filed at: 07/03/2020 0054   3a  Male UNDER 65: How often do you have five or more drinks on one occasion? 0 Filed at: 07/03/2020 0054   3b  FEMALE Any Age, or MALE 65+: How often do you have 4 or more drinks on one occassion? 0 Filed at: 07/03/2020 0054   Audit-C Score  0 Filed at: 07/03/2020 0054                  JERONIMO/DAST-10      Most Recent Value   How many times in the past year have you    Used an illegal drug or used a prescription medication for non-medical reasons? Never Filed at: 07/03/2020 0054                                MDM  Number of Diagnoses or Management Options  Pain of left calf:   Traumatic ecchymosis of left lower leg, initial encounter:   Diagnosis management comments: Based on history and exam I do not suspect DVT but gastrocnemius or soleus tear  She is neurovascularly in tact distally and has normal ROM  I do not feel she has tear of her achilles based on history and exam  Follow up with PCP          Disposition  Final diagnoses:   Pain of left calf   Traumatic ecchymosis of left lower leg, initial encounter     Time reflects when diagnosis was documented in both MDM as applicable and the Disposition within this note     Time User Action Codes Description Comment    7/3/2020  1:19 AM Connor Pilar Add [S22 246] Left leg pain     7/3/2020  1:19 AM Aleksey CASAS Remove [M79 605] Left leg pain     7/3/2020  1:19 AM Aleksey CASAS Add [M79 662] Pain of left calf     7/3/2020  1:20 AM Connor Pilar Add [S80 12XA] Traumatic ecchymosis of left lower leg, initial encounter       ED Disposition     ED Disposition Condition Date/Time Comment    Discharge Good Fri Jul 3, 2020  1:19 AM Alejandra Giraldo discharge to home/self care  Follow-up Information     Follow up With Specialties Details Why María Souza MD Family Medicine Schedule an appointment as soon as possible for a visit today  [address] [place] [address] [address]  [phone]            Discharge Medication List as of 7/3/2020  1:21 AM      CONTINUE these medications which have NOT CHANGED    Details   ALBUTEROL SULFATE IN Inhale, Until Discontinued, Historical Med      atoMOXetine (STRATTERA) 25 mg capsule TK ONE C PO ONCE D, Historical Med      busPIRone (BUSPAR) 5 mg tablet TK 1 T PO TID, Historical Med      DOXEPIN HCL PO Take by mouth, Until Discontinued, Historical Med      escitalopram (LEXAPRO) 10 mg tablet Take 10 mg by mouth, Historical Med      gabapentin (NEURONTIN) 300 mg capsule gabapentin 600 mg tablet, Historical Med      haloperidol (HALDOL) 2 mg tablet TK 1 T PO  BID, Historical Med      melatonin 3 mg Take 5 mg by mouth, Historical Med      QUEtiapine Fumarate (SEROQUEL PO) Take by mouth, Until Discontinued, Historical Med           No discharge procedures on file      PDMP Review     None          ED Provider  Electronically Signed by           Britton Arreaga PA-C  07/03/20 9094

## 2020-07-17 ENCOUNTER — APPOINTMENT (EMERGENCY)
Dept: RADIOLOGY | Facility: HOSPITAL | Age: 45
End: 2020-07-17
Payer: COMMERCIAL

## 2020-07-17 ENCOUNTER — HOSPITAL ENCOUNTER (EMERGENCY)
Facility: HOSPITAL | Age: 45
Discharge: HOME | End: 2020-07-18
Attending: EMERGENCY MEDICINE | Admitting: EMERGENCY MEDICINE
Payer: COMMERCIAL

## 2020-07-17 ENCOUNTER — OFFICE VISIT (OUTPATIENT)
Dept: URGENT CARE | Age: 45
End: 2020-07-17
Payer: COMMERCIAL

## 2020-07-17 VITALS
OXYGEN SATURATION: 96 % | RESPIRATION RATE: 15 BRPM | HEART RATE: 70 BPM | DIASTOLIC BLOOD PRESSURE: 65 MMHG | TEMPERATURE: 97.4 F | SYSTOLIC BLOOD PRESSURE: 137 MMHG

## 2020-07-17 DIAGNOSIS — R07.9 CHEST PAIN, UNSPECIFIED TYPE: Primary | ICD-10-CM

## 2020-07-17 DIAGNOSIS — R07.9 CHEST PAIN: Primary | ICD-10-CM

## 2020-07-17 DIAGNOSIS — R10.9 ABDOMINAL PAIN, UNSPECIFIED ABDOMINAL LOCATION: ICD-10-CM

## 2020-07-17 DIAGNOSIS — B96.89 BACTERIAL VAGINOSIS: ICD-10-CM

## 2020-07-17 DIAGNOSIS — N76.0 BACTERIAL VAGINOSIS: ICD-10-CM

## 2020-07-17 LAB
ANION GAP SERPL CALCULATED.3IONS-SCNC: 8 MMOL/L (ref 4–13)
ATRIAL RATE: 71 BPM
BASOPHILS # BLD AUTO: 0.05 THOUSANDS/ΜL (ref 0–0.1)
BASOPHILS NFR BLD AUTO: 1 % (ref 0–1)
BILIRUB UR QL STRIP: NEGATIVE
BUN SERPL-MCNC: 19 MG/DL (ref 5–25)
CALCIUM SERPL-MCNC: 8.2 MG/DL (ref 8.3–10.1)
CHLORIDE SERPL-SCNC: 103 MMOL/L (ref 100–108)
CLARITY UR: CLEAR
CLARITY, POC: CLEAR
CO2 SERPL-SCNC: 29 MMOL/L (ref 21–32)
COLOR UR: YELLOW
COLOR, POC: YELLOW
CREAT SERPL-MCNC: 1.04 MG/DL (ref 0.6–1.3)
EOSINOPHIL # BLD AUTO: 0.13 THOUSAND/ΜL (ref 0–0.61)
EOSINOPHIL NFR BLD AUTO: 2 % (ref 0–6)
ERYTHROCYTE [DISTWIDTH] IN BLOOD BY AUTOMATED COUNT: 14.9 % (ref 11.6–15.1)
EXT PREG TEST URINE: NEGATIVE
EXT. CONTROL ED NAV: NORMAL
GFR SERPL CREATININE-BSD FRML MDRD: 64 ML/MIN/1.73SQ M
GLUCOSE SERPL-MCNC: 106 MG/DL (ref 65–140)
GLUCOSE UR STRIP-MCNC: NEGATIVE MG/DL
HCT VFR BLD AUTO: 39.4 % (ref 34.8–46.1)
HGB BLD-MCNC: 12.4 G/DL (ref 11.5–15.4)
HGB UR QL STRIP.AUTO: NEGATIVE
IMM GRANULOCYTES # BLD AUTO: 0.01 THOUSAND/UL (ref 0–0.2)
IMM GRANULOCYTES NFR BLD AUTO: 0 % (ref 0–2)
KETONES UR STRIP-MCNC: NEGATIVE MG/DL
LEUKOCYTE ESTERASE UR QL STRIP: NEGATIVE
LYMPHOCYTES # BLD AUTO: 2.23 THOUSANDS/ΜL (ref 0.6–4.47)
LYMPHOCYTES NFR BLD AUTO: 34 % (ref 14–44)
MCH RBC QN AUTO: 25.9 PG (ref 26.8–34.3)
MCHC RBC AUTO-ENTMCNC: 31.5 G/DL (ref 31.4–37.4)
MCV RBC AUTO: 82 FL (ref 82–98)
MONOCYTES # BLD AUTO: 0.44 THOUSAND/ΜL (ref 0.17–1.22)
MONOCYTES NFR BLD AUTO: 7 % (ref 4–12)
NEUTROPHILS # BLD AUTO: 3.65 THOUSANDS/ΜL (ref 1.85–7.62)
NEUTS SEG NFR BLD AUTO: 56 % (ref 43–75)
NITRITE UR QL STRIP: NEGATIVE
NRBC BLD AUTO-RTO: 0 /100 WBCS
P AXIS: 36 DEGREES
PH UR STRIP.AUTO: 5.5 [PH] (ref 4.5–8)
PLATELET # BLD AUTO: 206 THOUSANDS/UL (ref 149–390)
PMV BLD AUTO: 9.8 FL (ref 8.9–12.7)
POTASSIUM SERPL-SCNC: 4.9 MMOL/L (ref 3.5–5.3)
PR INTERVAL: 142 MS
PROT UR STRIP-MCNC: NEGATIVE MG/DL
QRS AXIS: 40 DEGREES
QRSD INTERVAL: 78 MS
QT INTERVAL: 412 MS
QTC INTERVAL: 447 MS
RBC # BLD AUTO: 4.78 MILLION/UL (ref 3.81–5.12)
SL AMB  POCT GLUCOSE, UA: NEGATIVE
SL AMB LEUKOCYTE ESTERASE,UA: ABNORMAL
SL AMB POCT BILIRUBIN,UA: NEGATIVE
SL AMB POCT BLOOD,UA: NEGATIVE
SL AMB POCT CLARITY,UA: CLEAR
SL AMB POCT COLOR,UA: YELLOW
SL AMB POCT KETONES,UA: NEGATIVE
SL AMB POCT NITRITE,UA: NEGATIVE
SL AMB POCT PH,UA: 6
SL AMB POCT SPECIFIC GRAVITY,UA: 1.01
SL AMB POCT URINE PROTEIN: NEGATIVE
SL AMB POCT UROBILINOGEN: 0.2
SODIUM SERPL-SCNC: 140 MMOL/L (ref 136–145)
SP GR UR STRIP.AUTO: >=1.03 (ref 1–1.03)
T WAVE AXIS: 22 DEGREES
TROPONIN I SERPL-MCNC: <0.02 NG/ML
TROPONIN I SERPL-MCNC: <0.02 NG/ML
UROBILINOGEN UR QL STRIP.AUTO: 0.2 E.U./DL
VENTRICULAR RATE: 71 BPM
WBC # BLD AUTO: 6.51 THOUSAND/UL (ref 4.31–10.16)

## 2020-07-17 PROCEDURE — 93005 ELECTROCARDIOGRAM TRACING: CPT

## 2020-07-17 PROCEDURE — 81025 URINE PREGNANCY TEST: CPT | Performed by: EMERGENCY MEDICINE

## 2020-07-17 PROCEDURE — G0382 LEV 3 HOSP TYPE B ED VISIT: HCPCS | Performed by: PHYSICIAN ASSISTANT

## 2020-07-17 PROCEDURE — 99285 EMERGENCY DEPT VISIT HI MDM: CPT | Performed by: EMERGENCY MEDICINE

## 2020-07-17 PROCEDURE — 87591 N.GONORRHOEAE DNA AMP PROB: CPT | Performed by: EMERGENCY MEDICINE

## 2020-07-17 PROCEDURE — 84484 ASSAY OF TROPONIN QUANT: CPT | Performed by: EMERGENCY MEDICINE

## 2020-07-17 PROCEDURE — 36415 COLL VENOUS BLD VENIPUNCTURE: CPT | Performed by: EMERGENCY MEDICINE

## 2020-07-17 PROCEDURE — 99285 EMERGENCY DEPT VISIT HI MDM: CPT

## 2020-07-17 PROCEDURE — 93005 ELECTROCARDIOGRAM TRACING: CPT | Performed by: PHYSICIAN ASSISTANT

## 2020-07-17 PROCEDURE — 99283 EMERGENCY DEPT VISIT LOW MDM: CPT | Performed by: PHYSICIAN ASSISTANT

## 2020-07-17 PROCEDURE — 81002 URINALYSIS NONAUTO W/O SCOPE: CPT | Performed by: PHYSICIAN ASSISTANT

## 2020-07-17 PROCEDURE — 87491 CHLMYD TRACH DNA AMP PROBE: CPT | Performed by: EMERGENCY MEDICINE

## 2020-07-17 PROCEDURE — 85025 COMPLETE CBC W/AUTO DIFF WBC: CPT | Performed by: EMERGENCY MEDICINE

## 2020-07-17 PROCEDURE — 93010 ELECTROCARDIOGRAM REPORT: CPT | Performed by: PHYSICIAN ASSISTANT

## 2020-07-17 PROCEDURE — 81003 URINALYSIS AUTO W/O SCOPE: CPT

## 2020-07-17 PROCEDURE — 71046 X-RAY EXAM CHEST 2 VIEWS: CPT

## 2020-07-17 PROCEDURE — 80048 BASIC METABOLIC PNL TOTAL CA: CPT | Performed by: EMERGENCY MEDICINE

## 2020-07-17 RX ORDER — METRONIDAZOLE 500 MG/1
500 TABLET ORAL EVERY 12 HOURS SCHEDULED
Qty: 14 TABLET | Refills: 0 | Status: SHIPPED | OUTPATIENT
Start: 2020-07-17 | End: 2020-07-24

## 2020-07-17 RX ORDER — BENZTROPINE MESYLATE 1 MG/1
1 TABLET ORAL 2 TIMES DAILY
COMMUNITY
End: 2021-06-02 | Stop reason: ALTCHOICE

## 2020-07-17 RX ORDER — HYDROXYZINE 50 MG/1
50 TABLET, FILM COATED ORAL 3 TIMES DAILY PRN
COMMUNITY
End: 2021-06-02 | Stop reason: ALTCHOICE

## 2020-07-17 RX ORDER — ROPINIROLE 1 MG/1
1 TABLET, FILM COATED ORAL 3 TIMES DAILY
COMMUNITY
End: 2021-06-02 | Stop reason: ALTCHOICE

## 2020-07-17 NOTE — PROGRESS NOTES
[facility]      NAME: Keyla Hopkins is a 50 y o  female  : 1975    MRN: [id number]  DATE: 2020  TIME: 11:18 AM    Assessment and Plan   Chest pain, unspecified type [R07 9]  1  Chest pain, unspecified type  Transfer to other facility   2  Abdominal pain, unspecified abdominal location  Transfer to other facility    POCT urine dip     Twelve lead EKG performed in the office  Normal sinus rhythm present  Official report pending    POCT urine dip   Order: [id number]   Status:  Final result   Visible to patient:  No (Not Released)   Next appt:  None   Dx:  Abdominal pain, unspecified abdominal  Component 20 1107   LEUKOCYTE ESTERASE,UA Small    NITRITE,UA Negative    SL AMB POCT UROBILINOGEN 0 2    POCT URINE PROTEIN Negative     PH,UA 6 0    BLOOD,UA Negative    SPECIFIC GRAVITY,UA 1 010    KETONES,UA Negative    BILIRUBIN,UA Negative    GLUCOSE, UA Negative     COLOR,UA Yellow    CLARITY,UA Clear          Specimen Collected: 20 11:07 Last Resulted: 20 11:07               Patient Instructions     Patient sent to ED for further evaluation of chest pain / abdominal pain  Chief Complaint     Chief Complaint   Patient presents with    Chest Pain     Patient reports chest pain, onset last evening, no dizziness or lightheadedness reported  Patient reports it is mid-sternal   Also c/o yeast infection sx and a "loose screw" in her left wrist   Also c/o abdominal pain, onset several days ago  States she recently had med changes  History of Present Illness       Patient is a 23-year-old female presenting the office for chest pain abdominal pain  Patient states her chest pain has been ongoing for the past 12 hours in duration  Patient states that she was sitting on her couch and began to experience left-sided chest pain which then traveled centrally and is now situated just right of her sternum  Patient describes the pain as a tightness/pressure    Patient states the pain is generally constant in nature and does not become worse with any range of motion or direct contact  Patient denies any prior history of chest pain such as this  In addition to chest pain she is also complaining about lower abdominal pain which has been ongoing for the past few days in duration  Patient denies any urinary symptoms  Patient denies any fevers any chills  Patient denies any problems with her eyes ears nose throat  Patient does admit to having shortness of breath but associates this with asthma  Patient denies any nausea vomiting diarrhea  Patient has any muscle aches body aches  Patient denies any headache, neck pain, neck stiffness, dizziness, confusion  Patient states she has had no medical intervention for symptoms  Patient offers no other complaints at this time  Review of Systems   Review of Systems   Constitutional: Negative  HENT: Negative  Eyes: Negative  Respiratory: Negative  Cardiovascular: Positive for chest pain  Gastrointestinal: Positive for abdominal pain  Negative for abdominal distention, anal bleeding, blood in stool, constipation, diarrhea, nausea, rectal pain and vomiting  Endocrine: Negative  Genitourinary: Negative  Musculoskeletal: Negative  Skin: Negative  Allergic/Immunologic: Negative  Neurological: Negative  Hematological: Negative  Psychiatric/Behavioral: Negative            Current Medications       Current Outpatient Medications:     benztropine (COGENTIN) 1 mg tablet, Take 1 mg by mouth 2 (two) times a day, Disp: , Rfl:     busPIRone (BUSPAR) 5 mg tablet, TK 1 T PO TID, Disp: , Rfl:     gabapentin (NEURONTIN) 300 mg capsule, gabapentin 600 mg tablet, Disp: , Rfl:     haloperidol (HALDOL) 2 mg tablet, TK 1 T PO  BID, Disp: , Rfl:     hydrOXYzine HCL (ATARAX) 50 mg tablet, Take 50 mg by mouth 3 (three) times a day as needed for itching, Disp: , Rfl:     melatonin 3 mg, Take 5 mg by mouth, Disp: , Rfl:    rOPINIRole (REQUIP) 1 mg tablet, Take 1 mg by mouth 3 (three) times a day, Disp: , Rfl:     ALBUTEROL SULFATE IN, Inhale, Disp: , Rfl:     atoMOXetine (STRATTERA) 25 mg capsule, TK ONE C PO ONCE D, Disp: , Rfl:     escitalopram (LEXAPRO) 10 mg tablet, Take 10 mg by mouth, Disp: , Rfl:     QUEtiapine Fumarate (SEROQUEL PO), Take by mouth, Disp: , Rfl:     Current Allergies     Allergies as of 07/17/2020    (No Known Allergies)            The following portions of the patient's history were reviewed and updated as appropriate: allergies, current medications, past family history, past medical history, past social history, past surgical history and problem list      Past Medical History:   Diagnosis Date    Asthma     Bipolar disorder ([facility] [place] [address] )     Hepatitis C     Psychiatric disorder        Past Surgical History:   Procedure Laterality Date    HAND SURGERY      TUBAL LIGATION         Family History   Problem Relation Age of Onset    Coronary artery disease Mother     Diabetes Mother          Medications have been verified  Objective   /65   Pulse 70   Temp (!) 97 4 °F (36 3 °C)   Resp 15   SpO2 96%        Physical Exam     Physical Exam   Constitutional: She is oriented to person, place, and time  She appears well-developed and well-nourished  HENT:   Head: Normocephalic  Eyes: Pupils are equal, round, and reactive to light  Conjunctivae and EOM are normal    Neck: Normal range of motion  Cardiovascular: Normal rate, regular rhythm, normal heart sounds and intact distal pulses  Pulmonary/Chest: Effort normal and breath sounds normal    Neurological: She is alert and oriented to person, place, and time  Skin: Skin is warm  Capillary refill takes less than 2 seconds  Psychiatric: She has a normal mood and affect  Her behavior is normal  Judgment and thought content normal    Nursing note and vitals reviewed

## 2020-07-18 VITALS
TEMPERATURE: 99 F | OXYGEN SATURATION: 100 % | HEART RATE: 69 BPM | SYSTOLIC BLOOD PRESSURE: 127 MMHG | DIASTOLIC BLOOD PRESSURE: 66 MMHG | RESPIRATION RATE: 20 BRPM | BODY MASS INDEX: 46.55 KG/M2 | WEIGHT: 271.17 LBS

## 2020-07-18 LAB
ATRIAL RATE: 91 BPM
P AXIS: 70 DEGREES
PR INTERVAL: 140 MS
QRS AXIS: 59 DEGREES
QRSD INTERVAL: 86 MS
QT INTERVAL: 352 MS
QTC INTERVAL: 432 MS
T WAVE AXIS: 38 DEGREES
VENTRICULAR RATE: 91 BPM

## 2020-07-18 PROCEDURE — 93010 ELECTROCARDIOGRAM REPORT: CPT | Performed by: INTERNAL MEDICINE

## 2020-07-18 RX ORDER — FAMOTIDINE 20 MG/1
20 TABLET, FILM COATED ORAL ONCE
Status: COMPLETED | OUTPATIENT
Start: 2020-07-18 | End: 2020-07-18

## 2020-07-18 RX ADMIN — FAMOTIDINE 20 MG: 20 TABLET, FILM COATED ORAL at 00:08

## 2020-07-18 NOTE — ED PROVIDER NOTES
History  Chief Complaint   Patient presents with    Chest Pain     pt reports intermittent chest pain that started yesterday, was at care now today, was told to come to ER  denies SOB   Vaginal Discharge     pt reports vaginal discharge     47-year-old female with a history of asthma, bipolar depression presents with a 2 day history of right-sided chest pain on and off   Patient states that last several minutes  It has not made any worse with exertion  No shortness of breath or diaphoresis or vomiting associated with it  She went to an urgent care center today and was told to come to an emergency department for further workup  She has no known cardiac history but has a family history of coronary artery disease  She also complains of a 2 day history of white vaginal discharge  She thought she had a yeast infection but is unsure  No vaginal itching or burning  No dysuria  She denies being sexually active        History provided by:  Patient   used: No    Chest Pain   Pain location:  R chest  Pain quality: tightness    Pain radiates to:  Does not radiate  Pain radiates to the back: no    Onset quality:  Gradual  Duration:  2 days  Timing:  Intermittent  Progression:  Unchanged  Chronicity:  New  Context: at rest    Relieved by:  None tried  Worsened by:  Nothing tried  Ineffective treatments:  None tried  Associated symptoms: no abdominal pain, no altered mental status, no anorexia, no anxiety, no back pain, no claudication, no cough, no diaphoresis, no dizziness, no dysphagia, no fatigue, no fever, no headache, no heartburn, no lower extremity edema, no nausea, no near-syncope, no numbness, no orthopnea, no palpitations, no PND, no shortness of breath, no syncope, not vomiting and no weakness    Risk factors: obesity and smoking    Risk factors: no coronary artery disease, no diabetes mellitus, no high cholesterol, no hypertension and no immobilization    Vaginal Discharge   Quality: White  Onset quality:  Gradual  Duration:  2 days  Timing:  Intermittent  Progression:  Unchanged  Chronicity:  New  Context: spontaneously    Context: not after intercourse, not after urination, not at rest, not during bowel movement, not during intercourse, not during urination, not genital trauma and not recent antibiotic use    Relieved by:  None tried  Worsened by:  Nothing  Ineffective treatments:  None tried  Associated symptoms: no abdominal pain, no dysuria, no fever, no genital lesions, no nausea, no rash, no urinary frequency, no urinary hesitancy, no urinary incontinence, no vaginal itching and no vomiting    Risk factors: no new sexual partner, no STI, no STI exposure and no unprotected sex        Prior to Admission Medications   Prescriptions Last Dose Informant Patient Reported? Taking?    ALBUTEROL SULFATE IN   Yes No   Sig: Inhale   QUEtiapine Fumarate (SEROQUEL PO)   Yes No   Sig: Take by mouth   atoMOXetine (STRATTERA) 25 mg capsule   Yes No   Sig: TK ONE C PO ONCE D   benztropine (COGENTIN) 1 mg tablet   Yes No   Sig: Take 1 mg by mouth 2 (two) times a day   busPIRone (BUSPAR) 5 mg tablet   Yes No   Sig: TK 1 T PO TID   escitalopram (LEXAPRO) 10 mg tablet   Yes No   Sig: Take 10 mg by mouth   gabapentin (NEURONTIN) 300 mg capsule   Yes No   Sig: gabapentin 600 mg tablet   haloperidol (HALDOL) 2 mg tablet   Yes No   Sig: TK 1 T PO  BID   hydrOXYzine HCL (ATARAX) 50 mg tablet   Yes No   Sig: Take 50 mg by mouth 3 (three) times a day as needed for itching   melatonin 3 mg   Yes No   Sig: Take 5 mg by mouth   rOPINIRole (REQUIP) 1 mg tablet   Yes No   Sig: Take 1 mg by mouth 3 (three) times a day      Facility-Administered Medications: None       Past Medical History:   Diagnosis Date    Asthma     Bipolar disorder ([facility] [address] )     Hepatitis C     Psychiatric disorder        Past Surgical History:   Procedure Laterality Date    HAND SURGERY      TUBAL LIGATION         Family History   Problem Relation Age of Onset    Coronary artery disease Mother     Diabetes Mother      I have reviewed and agree with the history as documented  E-Cigarette/Vaping     E-Cigarette/Vaping Substances     Social History     Tobacco Use    Smoking status: Current Every Day Smoker     Packs/day: 0 50     Types: Cigarettes    Smokeless tobacco: Never Used   Substance Use Topics    Alcohol use: Not Currently    Drug use: Not Currently     Types: Marijuana     Comment: crack        Review of Systems   Constitutional: Negative  Negative for chills, diaphoresis, fatigue and fever  HENT: Negative  Negative for congestion, rhinorrhea, sore throat and trouble swallowing  Eyes: Negative  Negative for discharge, redness and itching  Respiratory: Negative  Negative for apnea, cough, chest tightness, shortness of breath and wheezing  Cardiovascular: Positive for chest pain  Negative for palpitations, orthopnea, claudication, leg swelling, syncope, PND and near-syncope  Gastrointestinal: Negative  Negative for abdominal pain, anorexia, heartburn, nausea and vomiting  Endocrine: Negative  Genitourinary: Positive for vaginal discharge  Negative for bladder incontinence, dysuria, flank pain, frequency, hesitancy, urgency and vaginal bleeding  Musculoskeletal: Negative  Negative for back pain  Skin: Negative  Allergic/Immunologic: Negative  Neurological: Negative  Negative for dizziness, syncope, weakness, light-headedness, numbness and headaches  Hematological: Negative  All other systems reviewed and are negative  Physical Exam  Physical Exam   Constitutional: She is oriented to person, place, and time  She appears well-developed and well-nourished  Non-toxic appearance  She does not have a sickly appearance  She does not appear ill  No distress  HENT:   Head: Normocephalic and atraumatic     Right Ear: External ear normal    Left Ear: External ear normal    Eyes: Pupils are equal, round, and reactive to light  Conjunctivae are normal  Right eye exhibits no discharge  Left eye exhibits no discharge  No scleral icterus  Cardiovascular: Normal rate, regular rhythm and normal heart sounds  Exam reveals no gallop and no friction rub  No murmur heard  Pulmonary/Chest: Effort normal and breath sounds normal  No stridor  No respiratory distress  She has no wheezes  She has no rales  She exhibits no tenderness  Abdominal: Soft  Bowel sounds are normal  She exhibits no distension and no mass  There is no tenderness  No hernia  Hernia confirmed negative in the right inguinal area and confirmed negative in the left inguinal area  Genitourinary: There is no rash, tenderness, lesion or injury on the right labia  There is no rash, tenderness, lesion or injury on the left labia  Cervix exhibits no motion tenderness, no discharge and no friability  No erythema, tenderness or bleeding in the vagina  No foreign body in the vagina  No signs of injury around the vagina  Vaginal discharge (Whitish/gray watery discharge) found  Lymphadenopathy: No inguinal adenopathy noted on the right or left side  Neurological: She is alert and oriented to person, place, and time  She has normal reflexes  She exhibits normal muscle tone  Skin: Skin is warm and dry  No rash noted  She is not diaphoretic  No erythema  No pallor  Psychiatric: She has a normal mood and affect  Nursing note and vitals reviewed        Vital Signs  ED Triage Vitals   Temperature Pulse Respirations Blood Pressure SpO2   07/17/20 1931 07/17/20 1931 07/17/20 1931 07/17/20 1934 07/17/20 1931   99 °F (37 2 °C) 98 16 115/65 97 %      Temp Source Heart Rate Source Patient Position - Orthostatic VS BP Location FiO2 (%)   07/17/20 1931 07/17/20 1931 07/17/20 1931 07/17/20 1931 --   Temporal Monitor Sitting Right arm       Pain Score       --                  Vitals:    07/17/20 1931 07/17/20 1934 07/17/20 2052   BP:  115/65 116/65   Pulse: 98  77 Patient Position - Orthostatic VS: Sitting  Lying         Visual Acuity      ED Medications  Medications - No data to display    Diagnostic Studies  Results Reviewed     Procedure Component Value Units Date/Time    Troponin I [[id number]]  (Normal) Collected:  07/17/20 2324    Lab Status:  Final result Specimen:  Blood from Arm, Left Updated:  07/17/20 2353     Troponin I <0 02 ng/mL     Chlamydia/GC amplified DNA by PCR [[id number]] Collected:  07/17/20 2052    Lab Status:   In process Specimen:  Genital from Endocervical Updated:  07/17/20 2117    Troponin I [[id number]]  (Normal) Collected:  07/17/20 2009    Lab Status:  Final result Specimen:  Blood from Arm, Right Updated:  07/17/20 2035     Troponin I <0 02 ng/mL     Basic metabolic panel [[id number]]  (Abnormal) Collected:  07/17/20 2009    Lab Status:  Final result Specimen:  Blood from Arm, Right Updated:  07/17/20 2026     Sodium 140 mmol/L      Potassium 4 9 mmol/L      Chloride 103 mmol/L      CO2 29 mmol/L      ANION GAP 8 mmol/L      BUN 19 mg/dL      Creatinine 1 04 mg/dL      Glucose 106 mg/dL      Calcium 8 2 mg/dL      eGFR 64 ml/min/1 73sq m     Narrative:       Arturo guidelines for Chronic Kidney Disease (CKD):     Stage 1 with normal or high GFR (GFR > 90 mL/min/1 73 square meters)    Stage 2 Mild CKD (GFR = 60-89 mL/min/1 73 square meters)    Stage 3A Moderate CKD (GFR = 45-59 mL/min/1 73 square meters)    Stage 3B Moderate CKD (GFR = 30-44 mL/min/1 73 square meters)    Stage 4 Severe CKD (GFR = 15-29 mL/min/1 73 square meters)    Stage 5 End Stage CKD (GFR <15 mL/min/1 73 square meters)  Note: GFR calculation is accurate only with a steady state creatinine    CBC and differential [[id number]]  (Abnormal) Collected:  07/17/20 2009    Lab Status:  Final result Specimen:  Blood from Arm, Right Updated:  07/17/20 2015     WBC 6 51 Thousand/uL      RBC 4 78 Million/uL      Hemoglobin 12 4 g/dL      Hematocrit 39 4 % MCV 82 fL      MCH 25 9 pg      MCHC 31 5 g/dL      RDW 14 9 %      MPV 9 8 fL      Platelets 576 Thousands/uL      nRBC 0 /100 WBCs      Neutrophils Relative 56 %      Immat GRANS % 0 %      Lymphocytes Relative 34 %      Monocytes Relative 7 %      Eosinophils Relative 2 %      Basophils Relative 1 %      Neutrophils Absolute 3 65 Thousands/µL      Immature Grans Absolute 0 01 Thousand/uL      Lymphocytes Absolute 2 23 Thousands/µL      Monocytes Absolute 0 44 Thousand/µL      Eosinophils Absolute 0 13 Thousand/µL      Basophils Absolute 0 05 Thousands/µL     POCT pregnancy, urine [[id number]]  (Normal) Resulted:  07/17/20 2004    Lab Status:  Final result Updated:  07/17/20 2004     EXT PREG TEST UR (Ref: Negative) negative     Control valid    POCT urinalysis dipstick [[id number]]  (Normal) Resulted:  07/17/20 2003    Lab Status:  Final result Specimen:  Urine Updated:  07/17/20 2004     Color, UA yellow     Clarity, UA clear    Urine Macroscopic, POC [[id number]] Collected:  07/17/20 2002    Lab Status:  Final result Specimen:  Urine Updated:  07/17/20 2003     Color, UA Yellow     Clarity, UA Clear     pH, UA 5 5     Leukocytes, UA Negative     Nitrite, UA Negative     Protein, UA Negative mg/dl      Glucose, UA Negative mg/dl      Ketones, UA Negative mg/dl      Urobilinogen, UA 0 2 E U /dl      Bilirubin, UA Negative     Blood, UA Negative     Specific Gravity, UA >=1 030    Narrative:       CLINITEK RESULT                 XR chest 2 views   ED Interpretation by Lori Urban DO (07/17 2038)   nad                 Procedures  ECG 12 Lead Documentation Only  Date/Time: 7/17/2020 8:08 PM  Performed by: Lori Urban DO  Authorized by: Lori Urban DO     Indications / Diagnosis:  Cp  ECG reviewed by me, the ED Provider: yes    Patient location:  ED  Interpretation:     Interpretation: normal    Rate:     ECG rate:  90    ECG rate assessment: normal    Rhythm:     Rhythm: sinus rhythm Ectopy:     Ectopy: none    Conduction:     Conduction: normal    ST segments:     ST segments:  Normal  T waves:     T waves: normal      ECG 12 Lead Documentation Only  Date/Time: 7/17/2020 11:19 PM  Performed by: John Mcconnell DO  Authorized by: John Mcconnell DO     Indications / Diagnosis:  Delta  ECG reviewed by me, the ED Provider: yes    Patient location:  ED  Interpretation:     Interpretation: normal    Rate:     ECG rate assessment: normal    Rhythm:     Rhythm: sinus rhythm    Ectopy:     Ectopy: none    Conduction:     Conduction: normal    ST segments:     ST segments:  Normal  T waves:     T waves: normal               ED Course       US AUDIT      Most Recent Value   Initial Alcohol Screen: US AUDIT-C    1  How often do you have a drink containing alcohol?  0 Filed at: 07/17/2020 2053   2  How many drinks containing alcohol do you have on a typical day you are drinking? 0 Filed at: 07/17/2020 2053   3a  Male UNDER 65: How often do you have five or more drinks on one occasion? 0 Filed at: 07/17/2020 2053   3b  FEMALE Any Age, or MALE 65+: How often do you have 4 or more drinks on one occassion? 0 Filed at: 07/17/2020 2053   Audit-C Score  0 Filed at: 07/17/2020 2053            HEART Risk Score      Most Recent Value   Heart Score Risk Calculator   History  0 Filed at: 07/18/2020 0000   ECG  0 Filed at: 07/18/2020 0000   Age  1 Filed at: 07/18/2020 0000   Risk Factors  1 Filed at: 07/18/2020 0000   Troponin  0 Filed at: 07/18/2020 0000   HEART Score  2 Filed at: 07/18/2020 0000            JERONIMO/DAST-10      Most Recent Value   How many times in the past year have you    Used an illegal drug or used a prescription medication for non-medical reasons? Never Filed at: 07/17/2020 2053                                MDM  Number of Diagnoses or Management Options  Diagnosis management comments: 13-year-old female presents with a 2 day history of intermittent chest pain    The chest pain is right-sided, nonexertional and not associated with shortness of breath, diaphoresis or vomiting  She also complains of a 2 day history of white vaginal discharge  On exam she is alert no distress  Her exam here is normal   Will do pelvic exam intake cultures to rule out sexually transmitted disease  Will do cardiac workup  Her story is really not convincing for ACS and her EKG is normal   Will do delta troponin and EKG       Amount and/or Complexity of Data Reviewed  Clinical lab tests: ordered and reviewed  Tests in the radiology section of CPT®: ordered and reviewed  Independent visualization of images, tracings, or specimens: yes          Disposition  Final diagnoses:   Chest pain   Bacterial vaginosis     Time reflects when diagnosis was documented in both MDM as applicable and the Disposition within this note     Time User Action Codes Description Comment    7/17/2020 11:58 PM Clark ROMERO Add [R07 9] Chest pain     7/17/2020 11:58 PM Bhaskarsamuelcarlie Anderson Add [N76 0,  B96 89] Bacterial vaginosis       ED Disposition     ED Disposition Condition Date/Time Comment    Discharge Good Fri Jul 17, 2020 11:58 PM Eloisa Fagan discharge to home/self care  Follow-up Information     Follow up With Specialties Details Why Sky Gómez MD Internal Medicine Schedule an appointment as soon as possible for a visit in 3 days  [id number]  [phone]            Patient's Medications   Discharge Prescriptions    METRONIDAZOLE (FLAGYL) 500 MG TABLET    Take 1 tablet (500 mg total) by mouth every 12 (twelve) hours for 7 days       Start Date: 7/17/2020 End Date: 7/24/2020       Order Dose: 500 mg       Quantity: 14 tablet    Refills: 0     No discharge procedures on file      PDMP Review     None          ED Provider  Electronically Signed by           Columba Garcia DO  07/17/20 2008       Columba Garcia DO  07/17/20 1400 [facility] DO  07/18/20 0000

## 2020-07-19 LAB
ATRIAL RATE: 86 BPM
C TRACH DNA SPEC QL NAA+PROBE: NEGATIVE
N GONORRHOEA DNA SPEC QL NAA+PROBE: NEGATIVE
P AXIS: 83 DEGREES
PR INTERVAL: 158 MS
QRS AXIS: 72 DEGREES
QRSD INTERVAL: 80 MS
QT INTERVAL: 384 MS
QTC INTERVAL: 459 MS
T WAVE AXIS: 42 DEGREES
VENTRICULAR RATE: 86 BPM

## 2020-07-19 PROCEDURE — 93010 ELECTROCARDIOGRAM REPORT: CPT | Performed by: INTERNAL MEDICINE

## 2020-09-23 ENCOUNTER — OFFICE VISIT (OUTPATIENT)
Dept: URGENT CARE | Age: 45
End: 2020-09-23
Payer: COMMERCIAL

## 2020-09-23 VITALS
HEIGHT: 64 IN | TEMPERATURE: 99.2 F | WEIGHT: 263 LBS | RESPIRATION RATE: 18 BRPM | SYSTOLIC BLOOD PRESSURE: 130 MMHG | DIASTOLIC BLOOD PRESSURE: 73 MMHG | BODY MASS INDEX: 44.9 KG/M2 | OXYGEN SATURATION: 96 % | HEART RATE: 95 BPM

## 2020-09-23 DIAGNOSIS — L03.90 CELLULITIS, UNSPECIFIED CELLULITIS SITE: Primary | ICD-10-CM

## 2020-09-23 PROCEDURE — 99283 EMERGENCY DEPT VISIT LOW MDM: CPT | Performed by: PHYSICIAN ASSISTANT

## 2020-09-23 PROCEDURE — 99213 OFFICE O/P EST LOW 20 MIN: CPT | Performed by: PHYSICIAN ASSISTANT

## 2020-09-23 PROCEDURE — G0382 LEV 3 HOSP TYPE B ED VISIT: HCPCS | Performed by: PHYSICIAN ASSISTANT

## 2020-09-23 RX ORDER — CEPHALEXIN 500 MG/1
500 CAPSULE ORAL EVERY 8 HOURS SCHEDULED
Qty: 30 CAPSULE | Refills: 0 | Status: SHIPPED | OUTPATIENT
Start: 2020-09-23 | End: 2020-10-03

## 2020-09-23 NOTE — PATIENT INSTRUCTIONS
Cellulitis   WHAT YOU NEED TO KNOW:   Cellulitis is a skin infection caused by bacteria  Cellulitis may go away on its own or you may need treatment  Your healthcare provider may draw a [place] around the outside edges of your cellulitis  If your cellulitis spreads, your healthcare provider will see it outside of the [place]  DISCHARGE INSTRUCTIONS:   Call 911 if:   · You have sudden trouble breathing or chest pain  Return to the emergency department if:   · Your wound gets larger and more painful  · You feel a crackling under your skin when you touch it  · You have purple dots or bumps on your skin, or you see bleeding under your skin  · You have new swelling and pain in your legs  · The red, warm, swollen area gets larger  · You see red streaks coming from the infected area  Contact your healthcare provider if:   · You have a fever  · Your fever or pain does not go away or gets worse  · The area does not get smaller after 2 days of antibiotics  · Your skin is flaking or peeling off  · You have questions or concerns about your condition or care  Medicines:   · Antibiotics  help treat the bacterial infection  · NSAIDs , such as ibuprofen, help decrease swelling, pain, and fever  NSAIDs can cause stomach bleeding or kidney problems in certain people  If you take blood thinner medicine, always ask if NSAIDs are safe for you  Always read the medicine label and follow directions  Do not give these medicines to children under 10months of age without direction from your child's healthcare provider  · Acetaminophen  decreases pain and fever  It is available without a doctor's order  Ask how much to take and how often to take it  Follow directions  Read the labels of all other medicines you are using to see if they also contain acetaminophen, or ask your doctor or pharmacist  Acetaminophen can cause liver damage if not taken correctly   Do not use more than 4 grams (4,000 milligrams) total of acetaminophen in one day  · Take your medicine as directed  Contact your healthcare provider if you think your medicine is not helping or if you have side effects  Tell him or her if you are allergic to any medicine  Keep a list of the medicines, vitamins, and herbs you take  Include the amounts, and when and why you take them  Bring the list or the pill bottles to follow-up visits  Carry your medicine list with you in case of an emergency  Self-care:   · Elevate the area above the level of your heart  as often as you can  This will help decrease swelling and pain  Prop the area on pillows or blankets to keep it elevated comfortably  · Clean the area daily until the wound scabs over  Gently wash the area with soap and water  Pat dry  Use dressings as directed  · Place cool or warm, wet cloths on the area as directed  Use clean cloths and clean water  Leave it on the area until the cloth is room temperature  Pat the area dry with a clean, dry cloth  The cloths may help decrease pain  Prevent cellulitis:   · Do not scratch bug bites or areas of injury  You increase your risk for cellulitis by scratching these areas  · Do not share personal items, such as towels, clothing, and razors  · Clean exercise equipment  with germ-killing  before and after you use it  · Wash your hands often  Use soap and water  Wash your hands after you use the bathroom, change a child's diapers, or sneeze  Wash your hands before you prepare or eat food  Use lotion to prevent dry, cracked skin  · Wear pressure stockings as directed  You may be told to wear the stockings if you have peripheral edema  The stockings improve blood flow and decrease swelling  · Treat athlete's foot  This can help prevent the spread of a bacterial skin infection  Follow up with your healthcare provider within 3 days, or as directed:   Your healthcare provider will check if your cellulitis is getting better  You may need different medicine  Write down your questions so you remember to ask them during your visits  © 2017 [employer] Information is for End User's use only and may not be sold, redistributed or otherwise used for commercial purposes  All illustrations and images included in CareNotes® are the copyrighted property of [employer]  or [employer]  The above information is an  only  It is not intended as medical advice for individual conditions or treatments  Talk to your doctor, nurse or pharmacist before following any medical regimen to see if it is safe and effective for you

## 2020-09-23 NOTE — PROGRESS NOTES
[facility]        NAME: Jenn Delgado is a 50 y o  female  : 1975    MRN: [id number]  DATE: 2020  TIME: 4:37 PM    /73   Pulse 95   Temp 99 2 °F (37 3 °C)   Resp 18   Ht 5' 4" (1 626 m)   Wt 119 kg (263 lb)   LMP 2020 (Approximate)   SpO2 96%   BMI 45 14 kg/m²     Assessment and Plan   Cellulitis, unspecified cellulitis site [L03 90]  1  Cellulitis, unspecified cellulitis site  cephalexin (KEFLEX) 500 mg capsule         Patient Instructions       Follow up with PCP in 3-5 days  Proceed to  ER if symptoms worsen  Chief Complaint     Chief Complaint   Patient presents with    Abscess     pt reports abscess on abd that started 4 days ago  No drainage noted          History of Present Illness       Pt with left abdomen redness and swelling and tender for 4 days       Review of Systems   Review of Systems   Constitutional: Negative  HENT: Negative  Eyes: Negative  Respiratory: Negative  Cardiovascular: Negative  Gastrointestinal: Negative  Endocrine: Negative  Genitourinary: Negative  Musculoskeletal: Negative  Skin: Positive for rash and wound  Allergic/Immunologic: Negative  Neurological: Negative  Hematological: Negative  Psychiatric/Behavioral: Negative  All other systems reviewed and are negative          Current Medications       Current Outpatient Medications:     ALBUTEROL SULFATE IN, Inhale, Disp: , Rfl:     ARIPiprazole (ABILIFY PO), Take by mouth, Disp: , Rfl:     benztropine (COGENTIN) 1 mg tablet, Take 1 mg by mouth 2 (two) times a day, Disp: , Rfl:     escitalopram (LEXAPRO) 10 mg tablet, Take 10 mg by mouth, Disp: , Rfl:     gabapentin (NEURONTIN) 300 mg capsule, gabapentin 600 mg tablet, Disp: , Rfl:     hydrOXYzine HCL (ATARAX) 50 mg tablet, Take 50 mg by mouth 3 (three) times a day as needed for itching, Disp: , Rfl:     melatonin 3 mg, Take 5 mg by mouth, Disp: , Rfl:     rOPINIRole (REQUIP) 1 mg tablet, Take 1 mg by mouth 3 (three) times a day, Disp: , Rfl:     atoMOXetine (STRATTERA) 25 mg capsule, TK ONE C PO ONCE D, Disp: , Rfl:     busPIRone (BUSPAR) 5 mg tablet, TK 1 T PO TID, Disp: , Rfl:     cephalexin (KEFLEX) 500 mg capsule, Take 1 capsule (500 mg total) by mouth every 8 (eight) hours for 10 days, Disp: 30 capsule, Rfl: 0    haloperidol (HALDOL) 2 mg tablet, TK 1 T PO  BID, Disp: , Rfl:     QUEtiapine Fumarate (SEROQUEL PO), Take by mouth, Disp: , Rfl:     Current Allergies     Allergies as of 09/23/2020    (No Known Allergies)            The following portions of the patient's history were reviewed and updated as appropriate: allergies, current medications, past family history, past medical history, past social history, past surgical history and problem list      Past Medical History:   Diagnosis Date    Asthma     Bipolar disorder ([facility] [address] )     Hepatitis C     Psychiatric disorder        Past Surgical History:   Procedure Laterality Date    HAND SURGERY      TUBAL LIGATION         Family History   Problem Relation Age of Onset    Coronary artery disease Mother     Diabetes Mother          Medications have been verified  Objective   /73   Pulse 95   Temp 99 2 °F (37 3 °C)   Resp 18   Ht 5' 4" (1 626 m)   Wt 119 kg (263 lb)   LMP 09/08/2020 (Approximate)   SpO2 96%   BMI 45 14 kg/m²        Physical Exam     Physical Exam  Vitals signs and nursing note reviewed  Constitutional:       Appearance: Normal appearance  HENT:      Head: Normocephalic and atraumatic  Right Ear: Tympanic membrane, ear canal and external ear normal       Left Ear: Tympanic membrane, ear canal and external ear normal       Nose: Nose normal       Mouth/Throat:      Mouth: Mucous membranes are moist       Pharynx: Oropharynx is clear  Eyes:      Extraocular Movements: Extraocular movements intact        Conjunctiva/sclera: Conjunctivae normal       Pupils: Pupils are equal, round, and reactive to light  Neck:      Musculoskeletal: Normal range of motion and neck supple  Cardiovascular:      Rate and Rhythm: Normal rate and regular rhythm  Pulses: Normal pulses  Heart sounds: Normal heart sounds  Pulmonary:      Effort: Pulmonary effort is normal       Breath sounds: Normal breath sounds  Abdominal:      General: Abdomen is flat  Bowel sounds are normal       Palpations: Abdomen is soft  Musculoskeletal: Normal range of motion  Skin:     General: Skin is warm  Comments: Left lower abdomen pea size erythema and mild tenderness    Neurological:      General: No focal deficit present  Mental Status: She is alert     Psychiatric:         Mood and Affect: Mood normal          Behavior: Behavior normal

## 2020-12-08 ENCOUNTER — HOSPITAL ENCOUNTER (EMERGENCY)
Facility: HOSPITAL | Age: 45
Discharge: HOME | End: 2020-12-08
Attending: EMERGENCY MEDICINE
Payer: COMMERCIAL

## 2020-12-08 VITALS
RESPIRATION RATE: 19 BRPM | WEIGHT: 267.86 LBS | HEART RATE: 88 BPM | OXYGEN SATURATION: 97 % | SYSTOLIC BLOOD PRESSURE: 130 MMHG | TEMPERATURE: 97.8 F | DIASTOLIC BLOOD PRESSURE: 85 MMHG | BODY MASS INDEX: 45.98 KG/M2

## 2020-12-08 DIAGNOSIS — L02.91 ABSCESS: Primary | ICD-10-CM

## 2020-12-08 LAB
EXT PREG TEST URINE: NEGATIVE
EXT. CONTROL ED NAV: NORMAL

## 2020-12-08 PROCEDURE — 81025 URINE PREGNANCY TEST: CPT | Performed by: PHYSICIAN ASSISTANT

## 2020-12-08 PROCEDURE — 99284 EMERGENCY DEPT VISIT MOD MDM: CPT | Performed by: EMERGENCY MEDICINE

## 2020-12-08 PROCEDURE — 99283 EMERGENCY DEPT VISIT LOW MDM: CPT

## 2020-12-08 RX ORDER — CLINDAMYCIN HYDROCHLORIDE 300 MG/1
300 CAPSULE ORAL 4 TIMES DAILY
Qty: 40 CAPSULE | Refills: 0 | Status: SHIPPED | OUTPATIENT
Start: 2020-12-08 | End: 2020-12-18

## 2021-07-24 ENCOUNTER — HOSPITAL ENCOUNTER (EMERGENCY)
Facility: HOSPITAL | Age: 46
Discharge: HOME | End: 2021-07-24
Attending: EMERGENCY MEDICINE
Payer: COMMERCIAL

## 2021-07-24 ENCOUNTER — APPOINTMENT (EMERGENCY)
Dept: RADIOLOGY | Facility: HOSPITAL | Age: 46
End: 2021-07-24
Payer: COMMERCIAL

## 2021-07-24 VITALS
DIASTOLIC BLOOD PRESSURE: 59 MMHG | RESPIRATION RATE: 17 BRPM | TEMPERATURE: 97.9 F | SYSTOLIC BLOOD PRESSURE: 131 MMHG | HEART RATE: 79 BPM | OXYGEN SATURATION: 96 %

## 2021-07-24 DIAGNOSIS — M25.531 RIGHT WRIST PAIN: Primary | ICD-10-CM

## 2021-07-24 PROCEDURE — 99284 EMERGENCY DEPT VISIT MOD MDM: CPT | Performed by: PHYSICIAN ASSISTANT

## 2021-07-24 PROCEDURE — 73110 X-RAY EXAM OF WRIST: CPT

## 2021-07-24 PROCEDURE — 99283 EMERGENCY DEPT VISIT LOW MDM: CPT

## 2021-07-24 RX ORDER — NAPROXEN 500 MG/1
500 TABLET ORAL 2 TIMES DAILY WITH MEALS
Qty: 30 TABLET | Refills: 0 | Status: SHIPPED | OUTPATIENT
Start: 2021-07-24

## 2021-07-24 RX ORDER — ACETAMINOPHEN 325 MG/1
650 TABLET ORAL ONCE
Status: COMPLETED | OUTPATIENT
Start: 2021-07-24 | End: 2021-07-24

## 2021-07-24 RX ADMIN — ACETAMINOPHEN 650 MG: 325 TABLET, FILM COATED ORAL at 08:03

## 2021-07-24 NOTE — ED PROVIDER NOTES
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  she denies injury  Patient is a 43-year-old female with past medical history of bipolar disorder who presents with right wrist pain since this morning  Patient had extensive surgery on bilateral wrist several years ago, with no known complications  Patient per starting this morning she noted an aching pain in her wrist that radiates into her hand and lower forearm  She states the pain is worse with movement and better with rest   She denies any known injury to the area, numbness, tingling, weakness of the hand, swelling, erythema  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 Taking?    ALBUTEROL SULFATE IN   Yes No   Sig: Inhale   albuterol (PROVENTIL HFA,VENTOLIN HFA) 90 mcg/act inhaler   No No   Sig: Inhale 2 puffs every 6 (six) hours as needed for wheezing or shortness of breath   melatonin 3 mg   Yes No   Sig: Take 5 mg by mouth   mupirocin (BACTROBAN) 2 % ointment   No No   Sig: Apply topically 3 (three) times a day   sulfamethoxazole-trimethoprim (BACTRIM DS) 800-160 mg per tablet   No No   Sig: Take 1 tablet by mouth every 12 (twelve) hours for 10 days      Facility-Administered Medications: None Past Medical History:   Diagnosis Date    Asthma     [facility] disorder ([facility] [address] [facility]     Hepatitis C     Psychiatric disorder        Past Surgical History:   Procedure Laterality Date    HAND SURGERY      TUBAL LIGATION         Family History   Problem Relation Age of Onset    Coronary artery disease Mother     Diabetes Mother      I have reviewed and agree with the history as documented  E-Cigarette/Vaping    E-Cigarette Use Never User      E-Cigarette/Vaping Substances    Nicotine No     THC No     CBD No     Flavoring No     Other No     Unknown No      Social History     Tobacco Use    Smoking status: Current Every Day Smoker     Packs/day: 0 50     Types: Cigarettes    Smokeless tobacco: Current User    Tobacco comment: reports less than half a pack of day  Vaping Use    Vaping Use: Never used   Substance Use Topics    Alcohol use: Not Currently    Drug use: Not Currently     Types: Marijuana     Comment: patient reports medical marijuana       Review of Systems   Constitutional: Negative for chills, diaphoresis and fever  HENT: Negative for congestion  Respiratory: Negative for cough, shortness of breath, wheezing and stridor  Cardiovascular: Negative for chest pain and palpitations  Gastrointestinal: Negative for abdominal pain, diarrhea, nausea and vomiting  Genitourinary: Negative for difficulty urinating  Musculoskeletal: Positive for arthralgias (right wrist pain)  Negative for joint swelling, myalgias and neck pain  Skin: Negative for color change, pallor and rash  Neurological: Negative for light-headedness, numbness and headaches  All other systems reviewed and are negative  Physical Exam  Physical Exam  Vitals and nursing note reviewed  Constitutional:       General: She is awake  She is not in acute distress  Appearance: She is well-developed  She is not ill-appearing, toxic-appearing or diaphoretic        Comments: Patient was sleeping upon my arrival at bedside  No acute distress, non-toxic appearing   HENT:      Head: Normocephalic and atraumatic  Right Ear: External ear normal       Left Ear: External ear normal       Nose: Nose normal    Eyes:      General: No scleral icterus  Conjunctiva/sclera: Conjunctivae normal       Pupils: Pupils are equal, round, and reactive to light  Neck:      Vascular: No JVD  Trachea: No tracheal deviation  Cardiovascular:      Rate and Rhythm: Normal rate and regular rhythm  Pulses: Normal pulses  Radial pulses are 2+ on the right side and 2+ on the left side  Heart sounds: Normal heart sounds, S1 normal and S2 normal    Pulmonary:      Effort: Pulmonary effort is normal  No tachypnea or respiratory distress  Breath sounds: Normal breath sounds  No decreased breath sounds or wheezing  Musculoskeletal:         General: No deformity  Right elbow: Normal       Left elbow: Normal       Right forearm: Tenderness (distal forarm) present  Left forearm: Normal       Right wrist: Tenderness present  No swelling, deformity, effusion, lacerations, bony tenderness, snuff box tenderness or crepitus  Decreased range of motion (minimally decreased secondary to pain)  Normal pulse  Left wrist: Normal       Right hand: Tenderness present  No swelling, deformity, lacerations or bony tenderness  Normal range of motion  Normal strength  Normal sensation  Normal capillary refill  Left hand: Normal       Cervical back: Normal range of motion and neck supple  Comments: Patient with diffuse tenderness to palpation of distal right forearm, wrist and proximal hand with no point tenderness or bony tenderness noted  No swelling, erythema, crepitus, maddison deformity noted  Minimally decreased active ROM of right wrist secondary to pain  Neurovascularly intact, 5/5 strength in bilateral upper extremities  Skin:     General: Skin is dry     Neurological:      Mental Status: She is alert and oriented to person, place, and time  GCS: GCS eye subscore is 4  GCS verbal subscore is 5  GCS motor subscore is 6  Psychiatric:         Behavior: Behavior normal  Behavior is cooperative  Vital Signs  ED Triage Vitals   Temperature Pulse Respirations Blood Pressure SpO2   07/24/21 0736 07/24/21 0736 07/24/21 0736 07/24/21 0738 07/24/21 0736   97 9 °F (36 6 °C) 79 17 131/59 96 %      Temp Source Heart Rate Source Patient Position - Orthostatic VS BP Location FiO2 (%)   07/24/21 0736 -- 07/24/21 0736 07/24/21 0736 --   Oral  Sitting Right arm       Pain Score       07/24/21 0803       4           Vitals:    07/24/21 0736 07/24/21 0738   BP:  131/59   Pulse: 79    Patient Position - Orthostatic VS: Sitting          Visual Acuity      ED Medications  Medications   acetaminophen (TYLENOL) tablet 650 mg (650 mg Oral Given 7/24/21 0803)       Diagnostic Studies  Results Reviewed     None                 XR wrist 3+ views RIGHT   ED Interpretation by Lucas Bang PA-C (07/24 3384)   No acute osseous abnormality                 Procedures  Procedures         ED Course                             SBIRT 22yo+      Most Recent Value   SBIRT (25 yo +)   In order to provide better care to our patients, we are screening all of our patients for alcohol and drug use  Would it be okay to ask you these screening questions? No Filed at: 07/24/2021 0741                    MDM  Number of Diagnoses or Management Options  Right wrist pain  Diagnosis management comments: Reviewed all results with patient, answered questions  Patient noted improvement of symptoms  Patient provided with Ace wrap, neurovascularly intact  Reviewed medication education, treatment at home  Recommended follow-up with Ortho for further evaluation of persistent symptoms  Recommended follow-up with PCP as needed for monitoring of symptoms   The management plan was discussed in detail with the patient at bedside and all questions were answered  Provided both verbal and written instructions  Reviewed red flag symptoms and strict return to ED instructions  Patient notes understanding and agrees to plan  Disposition  Final diagnoses:   Right wrist pain     Time reflects when diagnosis was documented in both MDM as applicable and the Disposition within this note     Time User Action Codes Description Comment    7/24/2021  8:38 AM Bonita Velez Add [M25 531] Right wrist pain       ED Disposition     ED Disposition Condition Date/Time Comment    Discharge Stable Sat Jul 24, 2021  8:39 AM Aniceto Huber discharge to home/self care  Follow-up Information     Follow up With Specialties Details Why [address] [facility] Emergency Department Emergency Medicine  If symptoms worsen [facility] [address]  [address] [facility] Emergency Department, [address] [place] [address], [address] Orthopedic Surgery   [address]  [address] [facility] [id number] [phone] [address], [address] [address], [address] [place], [place], [id number] [phone]    Leonard Barrett MD Family Medicine  As needed [address] [place] [address] [phone]             Patient's Medications   Discharge Prescriptions    NAPROXEN (NAPROSYN) 500 MG TABLET    Take 1 tablet (500 mg total) by mouth 2 (two) times a day with meals       Start Date: 7/24/2021 End Date: --       Order Dose: 500 mg       Quantity: 30 tablet    Refills: 0     No discharge procedures on file      PDMP Review     None          ED Provider  Electronically Signed by           Adonay Joseph PA-C  07/24/21 0182

## 2021-07-24 NOTE — Clinical Note
Timbo Valiente was seen and treated in our emergency department on 7/24/2021  Diagnosis:     Dyan  may return to work on return date  She may return on this date: 07/27/2021         If you have any questions or concerns, please don't hesitate to call        Taylor Hurd PA-C    ______________________________           _______________          _______________  Hospital Representative                              Date                                Time

## 2021-07-24 NOTE — DISCHARGE INSTRUCTIONS
Take naproxen as prescribed as needed for pain  Rest, ice, elevation, compression may help alleviate symptoms  Follow-up with Ortho for further evaluation of persistent symptoms  Follow-up with PCP as needed for monitoring of symptoms  Return to ED if symptoms worsen including increasing pain, swelling, numbness, tingling, weakness of the hand, skin color change

## 2021-08-23 ENCOUNTER — HOSPITAL ENCOUNTER (EMERGENCY)
Facility: HOSPITAL | Age: 46
Discharge: HOME | End: 2021-08-23
Attending: EMERGENCY MEDICINE | Admitting: EMERGENCY MEDICINE
Payer: COMMERCIAL

## 2021-08-23 ENCOUNTER — APPOINTMENT (EMERGENCY)
Dept: RADIOLOGY | Facility: HOSPITAL | Age: 46
End: 2021-08-23
Payer: COMMERCIAL

## 2021-08-23 VITALS
WEIGHT: 237.44 LBS | TEMPERATURE: 98.4 F | RESPIRATION RATE: 18 BRPM | OXYGEN SATURATION: 97 % | HEART RATE: 77 BPM | HEIGHT: 64 IN | DIASTOLIC BLOOD PRESSURE: 74 MMHG | SYSTOLIC BLOOD PRESSURE: 153 MMHG | BODY MASS INDEX: 40.54 KG/M2

## 2021-08-23 DIAGNOSIS — R07.9 CHEST PAIN WITH LOW RISK FOR CARDIAC ETIOLOGY: Primary | ICD-10-CM

## 2021-08-23 LAB
ANION GAP SERPL CALCULATED.3IONS-SCNC: 5 MMOL/L (ref 4–13)
BASOPHILS # BLD AUTO: 0.04 THOUSANDS/ΜL (ref 0–0.1)
BASOPHILS NFR BLD AUTO: 1 % (ref 0–1)
BUN SERPL-MCNC: 23 MG/DL (ref 5–25)
CALCIUM SERPL-MCNC: 8.2 MG/DL (ref 8.3–10.1)
CHLORIDE SERPL-SCNC: 103 MMOL/L (ref 100–108)
CO2 SERPL-SCNC: 30 MMOL/L (ref 21–32)
CREAT SERPL-MCNC: 1.06 MG/DL (ref 0.6–1.3)
EOSINOPHIL # BLD AUTO: 0.08 THOUSAND/ΜL (ref 0–0.61)
EOSINOPHIL NFR BLD AUTO: 2 % (ref 0–6)
ERYTHROCYTE [DISTWIDTH] IN BLOOD BY AUTOMATED COUNT: 14.1 % (ref 11.6–15.1)
GFR SERPL CREATININE-BSD FRML MDRD: 62 ML/MIN/1.73SQ M
GLUCOSE SERPL-MCNC: 110 MG/DL (ref 65–140)
HCT VFR BLD AUTO: 41 % (ref 34.8–46.1)
HGB BLD-MCNC: 12.7 G/DL (ref 11.5–15.4)
IMM GRANULOCYTES # BLD AUTO: 0.01 THOUSAND/UL (ref 0–0.2)
IMM GRANULOCYTES NFR BLD AUTO: 0 % (ref 0–2)
LYMPHOCYTES # BLD AUTO: 1.69 THOUSANDS/ΜL (ref 0.6–4.47)
LYMPHOCYTES NFR BLD AUTO: 44 % (ref 14–44)
MCH RBC QN AUTO: 25.7 PG (ref 26.8–34.3)
MCHC RBC AUTO-ENTMCNC: 31 G/DL (ref 31.4–37.4)
MCV RBC AUTO: 83 FL (ref 82–98)
MONOCYTES # BLD AUTO: 0.31 THOUSAND/ΜL (ref 0.17–1.22)
MONOCYTES NFR BLD AUTO: 8 % (ref 4–12)
NEUTROPHILS # BLD AUTO: 1.68 THOUSANDS/ΜL (ref 1.85–7.62)
NEUTS SEG NFR BLD AUTO: 45 % (ref 43–75)
NRBC BLD AUTO-RTO: 0 /100 WBCS
PLATELET # BLD AUTO: 234 THOUSANDS/UL (ref 149–390)
PMV BLD AUTO: 9.4 FL (ref 8.9–12.7)
POTASSIUM SERPL-SCNC: 4.3 MMOL/L (ref 3.5–5.3)
RBC # BLD AUTO: 4.95 MILLION/UL (ref 3.81–5.12)
SODIUM SERPL-SCNC: 138 MMOL/L (ref 136–145)
TROPONIN I SERPL-MCNC: <0.02 NG/ML
WBC # BLD AUTO: 3.81 THOUSAND/UL (ref 4.31–10.16)

## 2021-08-23 PROCEDURE — 71046 X-RAY EXAM CHEST 2 VIEWS: CPT

## 2021-08-23 PROCEDURE — 93005 ELECTROCARDIOGRAM TRACING: CPT

## 2021-08-23 PROCEDURE — 36415 COLL VENOUS BLD VENIPUNCTURE: CPT | Performed by: EMERGENCY MEDICINE

## 2021-08-23 PROCEDURE — 99285 EMERGENCY DEPT VISIT HI MDM: CPT

## 2021-08-23 PROCEDURE — 85025 COMPLETE CBC W/AUTO DIFF WBC: CPT | Performed by: EMERGENCY MEDICINE

## 2021-08-23 PROCEDURE — 80048 BASIC METABOLIC PNL TOTAL CA: CPT | Performed by: EMERGENCY MEDICINE

## 2021-08-23 PROCEDURE — 99285 EMERGENCY DEPT VISIT HI MDM: CPT | Performed by: EMERGENCY MEDICINE

## 2021-08-23 PROCEDURE — 84484 ASSAY OF TROPONIN QUANT: CPT | Performed by: EMERGENCY MEDICINE

## 2021-08-24 LAB
ATRIAL RATE: 79 BPM
P AXIS: 49 DEGREES
PR INTERVAL: 132 MS
QRS AXIS: 63 DEGREES
QRSD INTERVAL: 86 MS
QT INTERVAL: 358 MS
QTC INTERVAL: 410 MS
T WAVE AXIS: 53 DEGREES
VENTRICULAR RATE: 79 BPM

## 2021-08-24 PROCEDURE — 93010 ELECTROCARDIOGRAM REPORT: CPT | Performed by: INTERNAL MEDICINE

## 2021-08-24 NOTE — ED PROVIDER NOTES
History  Chief Complaint   Patient presents with    Chest Pain     pt reports CP x 1 week  Pain radiates to left shoulder and left neck      Pt is a 48year old female with a PMH of asthma and Bipolar I disorder presenting with chest pain x 1 week  Pt states she had been having left sided chest pain that was constant and radiated to her left shoulder and neck  States it "was difficult to describe because I never had something like it before"  She states the pain did dissipate on its own without intervention, but came back this morning 11 hours ago and has been constant since  Denies alleviating or exacerbating factors  States she did have abdominal bloating and left sided pain that also dissipated  Denies lightheadedness, dizziness, SOB, sweats, cough, hemoptysis, leg swelling, n/v/d/c  Chronic every day smoker  Mother had cardiac arrest        History provided by:  Patient   used: No    Chest Pain  Pain location:  L chest  Pain radiates to:  L shoulder and neck  Pain radiates to the back: no    Pain severity:  Severe  Onset quality:  Gradual  Duration:  1 week  Timing:  Intermittent  Progression:  Worsening  Chronicity:  New  Context: at rest    Relieved by:  Nothing  Worsened by:  Nothing tried  Ineffective treatments:  None tried  Associated symptoms: abdominal pain    Associated symptoms: no back pain, no nausea, no palpitations and not vomiting    Risk factors: obesity and smoking    Risk factors: no coronary artery disease, no diabetes mellitus, no high cholesterol, no hypertension, not male and no prior DVT/PE        Prior to Admission Medications   Prescriptions Last Dose Informant Patient Reported? Taking?    ALBUTEROL SULFATE IN   Yes No   Sig: Inhale   albuterol (PROVENTIL HFA,VENTOLIN HFA) 90 mcg/act inhaler   No No   Sig: Inhale 2 puffs every 6 (six) hours as needed for wheezing or shortness of breath   melatonin 3 mg   Yes No   Sig: Take 5 mg by mouth   mupirocin (BACTROBAN) 2 % ointment   No No   Sig: Apply topically 3 (three) times a day   naproxen (NAPROSYN) 500 mg tablet   No No   Sig: Take 1 tablet (500 mg total) by mouth 2 (two) times a day with meals      Facility-Administered Medications: None       Past Medical History:   Diagnosis Date    Asthma     B[facility] disorder ([facility] [address] )     Hepatitis C     Psychiatric disorder        Past Surgical History:   Procedure Laterality Date    HAND SURGERY      TUBAL LIGATION         Family History   Problem Relation Age of Onset    Coronary artery disease Mother     Diabetes Mother      I have reviewed and agree with the history as documented  E-Cigarette/Vaping    E-Cigarette Use Never User      E-Cigarette/Vaping Substances    Nicotine No     THC No     CBD No     Flavoring No     Other No     Unknown No      Social History     Tobacco Use    Smoking status: Current Every Day Smoker     Packs/day: 0 50     Types: Cigarettes    Smokeless tobacco: Current User    Tobacco comment: reports less than half a pack of day  Vaping Use    Vaping Use: Never used   Substance Use Topics    Alcohol use: Not Currently    Drug use: Not Currently     Types: Marijuana     Comment: patient reports medical marijuana       Review of Systems   Constitutional: Negative  HENT: Negative  Respiratory: Negative  Cardiovascular: Positive for chest pain  Negative for palpitations and leg swelling  Gastrointestinal: Positive for abdominal pain  Negative for abdominal distention, constipation, diarrhea, nausea and vomiting  Genitourinary: Negative  Musculoskeletal: Positive for arthralgias and neck pain  Negative for back pain  Neurological: Negative  All other systems reviewed and are negative  Physical Exam  Physical Exam  Constitutional:       General: She is not in acute distress  Appearance: She is well-developed  She is not ill-appearing or diaphoretic  HENT:      Head: Normocephalic and atraumatic        Right Ear: External ear normal       Left Ear: External ear normal       Nose: Nose normal    Eyes:      General: No scleral icterus  Right eye: No discharge  Left eye: No discharge  Extraocular Movements: Extraocular movements intact  Conjunctiva/sclera: Conjunctivae normal    Neck:     Cardiovascular:      Rate and Rhythm: Normal rate and regular rhythm  Pulses:           Radial pulses are 2+ on the left side  Heart sounds: Normal heart sounds  Pulmonary:      Effort: Pulmonary effort is normal       Breath sounds: Normal breath sounds  Chest:      Chest wall: Tenderness present  No swelling, crepitus or edema  Abdominal:      General: Abdomen is flat  Bowel sounds are normal  There is no distension  Tenderness: There is no abdominal tenderness  Musculoskeletal:         General: Normal range of motion  Left shoulder: Tenderness present  No swelling, deformity, bony tenderness or crepitus  Normal range of motion  Normal strength  Normal pulse  Left elbow: Normal         Arms:       Cervical back: Normal range of motion and neck supple  No edema or erythema  Pain with movement and muscular tenderness present  No spinous process tenderness  Normal range of motion  Skin:     General: Skin is warm and dry  Neurological:      General: No focal deficit present  Mental Status: She is alert and oriented to person, place, and time  Mental status is at baseline     Psychiatric:         Mood and Affect: Mood normal          Behavior: Behavior normal          Vital Signs  ED Triage Vitals [08/23/21 2011]   Temperature Pulse Respirations Blood Pressure SpO2   98 4 °F (36 9 °C) 77 18 153/74 97 %      Temp Source Heart Rate Source Patient Position - Orthostatic VS BP Location FiO2 (%)   Oral Monitor Sitting Right arm --      Pain Score       6           Vitals:    08/23/21 2011   BP: 153/74   Pulse: 77   Patient Position - Orthostatic VS: Sitting         Visual Acuity      ED Medications  Medications - No data to display    Diagnostic Studies  Results Reviewed     Procedure Component Value Units Date/Time    Basic metabolic panel [[id number]]  (Abnormal) Collected: 08/23/21 2047    Lab Status: Final result Specimen: Blood from Arm, Right Updated: 08/23/21 2113     Sodium 138 mmol/L      Potassium 4 3 mmol/L      Chloride 103 mmol/L      CO2 30 mmol/L      ANION GAP 5 mmol/L      BUN 23 mg/dL      Creatinine 1 06 mg/dL      Glucose 110 mg/dL      Calcium 8 2 mg/dL      eGFR 62 ml/min/1 73sq m     Narrative:      [place] guidelines for Chronic Kidney Disease (CKD):     Stage 1 with normal or high GFR (GFR > 90 mL/min/1 73 square meters)    Stage 2 Mild CKD (GFR = 60-89 mL/min/1 73 square meters)    Stage 3A Moderate CKD (GFR = 45-59 mL/min/1 73 square meters)    Stage 3B Moderate CKD (GFR = 30-44 mL/min/1 73 square meters)    Stage 4 Severe CKD (GFR = 15-29 mL/min/1 73 square meters)    Stage 5 End Stage CKD (GFR <15 mL/min/1 73 square meters)  Note: GFR calculation is accurate only with a steady state creatinine    Troponin I [[id number]]  (Normal) Collected: 08/23/21 2047    Lab Status: Final result Specimen: Blood from Arm, Right Updated: 08/23/21 2112     Troponin I <0 02 ng/mL     CBC and differential [[id number]]  (Abnormal) Collected: 08/23/21 2047    Lab Status: Final result Specimen: Blood from Arm, Right Updated: 08/23/21 2055     WBC 3 81 Thousand/uL      RBC 4 95 Million/uL      Hemoglobin 12 7 g/dL      Hematocrit 41 0 %      MCV 83 fL      MCH 25 7 pg      MCHC 31 0 g/dL      RDW 14 1 %      MPV 9 4 fL      Platelets 487 Thousands/uL      nRBC 0 /100 WBCs      Neutrophils Relative 45 %      Immat GRANS % 0 %      Lymphocytes Relative 44 %      Monocytes Relative 8 %      Eosinophils Relative 2 %      Basophils Relative 1 %      Neutrophils Absolute 1 68 Thousands/µL      Immature Grans Absolute 0 01 Thousand/uL      Lymphocytes Absolute 1 69 Thousands/µL      Monocytes Absolute 0 31 Thousand/µL      Eosinophils Absolute 0 08 Thousand/µL      Basophils Absolute 0 04 Thousands/µL                  X-ray chest 2 views   ED Interpretation by Amara Marcus PA-C (08/23 2059)   No acute cardiopulmonary disease                 Procedures  ECG 12 Lead Documentation Only    Date/Time: 8/23/2021 8:37 PM  Performed by: Amara Marcus PA-C  Authorized by: Amara Marcus PA-C     ECG reviewed by me, the ED Provider: yes    Patient location:  ED  Previous ECG:     Previous ECG:  Compared to current    Similarity:  No change    Comparison to cardiac monitor: No    Interpretation:     Interpretation: normal    Rate:     ECG rate:  79    ECG rate assessment: normal    Rhythm:     Rhythm: sinus rhythm    Ectopy:     Ectopy: none    QRS:     QRS axis:  Normal    QRS intervals:  Normal  Conduction:     Conduction: normal    ST segments:     ST segments:  Normal  T waves:     T waves: normal               ED Course  ED Course as of Aug 23 2259   Mon Aug 23, 2021   2116 Pt presenting for 1 week of chest pain that radiates to her left shoulder and neck  She has TTP on exam in these areas  Her vitals are stable  Cardiac workup is negative  I do not suspect PE or dissection based on history and exam  Continue supportive care  Follow up with PCP  Educated on return precautions  Stable for discharge  HEART Risk Score      Most Recent Value   Heart Score Risk Calculator   History  0 Filed at: 08/23/2021 2259   ECG  0 Filed at: 08/23/2021 2259   Age  1 Filed at: 08/23/2021 2259   Risk Factors  1 Filed at: 08/23/2021 2259   Troponin  0 Filed at: 08/23/2021 2259   HEART Score  2 Filed at: 08/23/2021 2259                      SBIRT 22yo+      Most Recent Value   SBIRT (25 yo +)   In order to provide better care to our patients, we are screening all of our patients for alcohol and drug use  Would it be okay to ask you these screening questions?   Yes Filed at: 08/23/2021 2052   Initial Alcohol Screen: US AUDIT-C    1  How often do you have a drink containing alcohol?  0 Filed at: 08/23/2021 2052   2  How many drinks containing alcohol do you have on a typical day you are drinking? 0 Filed at: 08/23/2021 2052   3b  FEMALE Any Age, or MALE 65+: How often do you have 4 or more drinks on one occassion? 0 Filed at: 08/23/2021 2052   Audit-C Score  0 Filed at: 08/23/2021 2052   JERONIOM: How many times in the past year have you    Used an illegal drug or used a prescription medication for non-medical reasons? Never Filed at: 08/23/2021 2052                    MDM  Number of Diagnoses or Management Options  Chest pain with low risk for cardiac etiology: new and requires workup     Amount and/or Complexity of Data Reviewed  Clinical lab tests: ordered and reviewed  Tests in the radiology section of CPT®: ordered and reviewed  Tests in the medicine section of CPT®: ordered and reviewed  Independent visualization of images, tracings, or specimens: yes    Risk of Complications, Morbidity, and/or Mortality  Presenting problems: moderate  Management options: moderate    Patient Progress  Patient progress: stable      Disposition  Final diagnoses:   Chest pain with low risk for cardiac etiology     Time reflects when diagnosis was documented in both MDM as applicable and the Disposition within this note     Time User Action Codes Description Comment    8/23/2021  9:20 PM John CASAS Add [R07 9] Chest pain with low risk for cardiac etiology       ED Disposition     ED Disposition Condition Date/Time Comment    Discharge Good Mon Aug 23, 2021  9:20 PM Abeba Jaramillo discharge to home/self care              Follow-up Information     Follow up With Specialties Details Why Contact Christel King MD Family Medicine Schedule an appointment as soon as possible for a visit today  [address] [place] [address]   [phone]            Discharge Medication List as of 8/23/2021  9:20 PM      CONTINUE these medications which have NOT CHANGED    Details   albuterol (PROVENTIL HFA,VENTOLIN HFA) 90 mcg/act inhaler Inhale 2 puffs every 6 (six) hours as needed for wheezing or shortness of breath, Starting Wed 6/2/2021, Normal      ALBUTEROL SULFATE IN Inhale, Until Discontinued, Historical Med      melatonin 3 mg Take 5 mg by mouth, Historical Med      mupirocin (BACTROBAN) 2 % ointment Apply topically 3 (three) times a day, Starting Thu 7/15/2021, Normal      naproxen (NAPROSYN) 500 mg tablet Take 1 tablet (500 mg total) by mouth 2 (two) times a day with meals, Starting Sat 7/24/2021, Normal           No discharge procedures on file      PDMP Review     None          ED Provider  Electronically Signed by           Louise Cardenas PA-C  08/23/21 6616